# Patient Record
Sex: MALE | Race: WHITE | NOT HISPANIC OR LATINO | Employment: FULL TIME | URBAN - METROPOLITAN AREA
[De-identification: names, ages, dates, MRNs, and addresses within clinical notes are randomized per-mention and may not be internally consistent; named-entity substitution may affect disease eponyms.]

---

## 2023-06-01 LAB
AMPHETAMINE (PRESENCE) IN URINE BY SCREEN METHOD: NORMAL
BARBITURATES PRESENCE IN URINE BY SCREEN METHOD: NORMAL
BENZODIAZEPINE (PRESENCE) IN URINE BY SCREEN METHOD: NORMAL
CANNABINOIDS IN URINE BY SCREEN METHOD: NORMAL
COCAINE (PRESENCE) IN URINE BY SCREEN METHOD: NORMAL
DRUG SCREEN COMMENT URINE: NORMAL
FENTANYL URINE: NORMAL
METHADONE (PRESENCE) IN URINE BY SCREEN METHOD: NORMAL
OPIATES (PRESENCE) IN URINE BY SCREEN METHOD: NORMAL
OXYCODONE (PRESENCE) IN URINE BY SCREEN METHOD: NORMAL
PHENCYCLIDINE (PRESENCE) IN URINE BY SCREEN METHOD: NORMAL

## 2023-07-10 ENCOUNTER — TELEPHONE (OUTPATIENT)
Dept: PRIMARY CARE | Facility: CLINIC | Age: 58
End: 2023-07-10
Payer: COMMERCIAL

## 2023-07-12 LAB
FERRITIN (UG/LL) IN SER/PLAS: 137 UG/L (ref 20–300)
IRON (UG/DL) IN SER/PLAS: 177 UG/DL (ref 35–150)
IRON BINDING CAPACITY (UG/DL) IN SER/PLAS: 439 UG/DL (ref 240–445)
IRON SATURATION (%) IN SER/PLAS: 40 % (ref 25–45)

## 2023-07-18 ENCOUNTER — APPOINTMENT (OUTPATIENT)
Dept: PRIMARY CARE | Facility: CLINIC | Age: 58
End: 2023-07-18
Payer: COMMERCIAL

## 2023-08-22 PROBLEM — G25.9 EXTRAPYRAMIDAL MOVEMENTS PRESENT: Status: ACTIVE | Noted: 2023-08-22

## 2023-08-22 PROBLEM — E83.52 HYPERCALCEMIA: Status: ACTIVE | Noted: 2023-08-22

## 2023-08-22 PROBLEM — K13.70 LESION OF UVULA: Status: ACTIVE | Noted: 2023-08-22

## 2023-08-22 PROBLEM — E78.00 ELEVATED CHOLESTEROL: Status: ACTIVE | Noted: 2023-08-22

## 2023-08-22 PROBLEM — G43.909 MIGRAINE HEADACHE: Status: ACTIVE | Noted: 2023-08-22

## 2023-08-22 PROBLEM — N20.0 CALCULUS OF KIDNEY: Status: ACTIVE | Noted: 2023-08-22

## 2023-08-22 PROBLEM — G25.71 AKATHISIA: Status: ACTIVE | Noted: 2023-08-22

## 2023-08-22 PROBLEM — E66.9 OBESITY (BMI 30-39.9): Status: ACTIVE | Noted: 2023-08-22

## 2023-08-22 PROBLEM — R30.0 DYSURIA: Status: ACTIVE | Noted: 2023-08-22

## 2023-08-22 PROBLEM — E55.9 VITAMIN D DEFICIENCY: Status: ACTIVE | Noted: 2023-08-22

## 2023-08-22 PROBLEM — G47.33 OBSTRUCTIVE SLEEP APNEA SYNDROME: Status: ACTIVE | Noted: 2023-08-22

## 2023-08-22 PROBLEM — F51.3 SOMNAMBULISM: Status: ACTIVE | Noted: 2023-08-22

## 2023-08-22 PROBLEM — D35.1 PARATHYROID ADENOMA: Status: ACTIVE | Noted: 2023-08-22

## 2023-08-22 PROBLEM — E34.9 TESTOSTERONE DEFICIENCY: Status: ACTIVE | Noted: 2023-08-22

## 2023-08-22 PROBLEM — J30.9 ALLERGIC RHINITIS: Status: ACTIVE | Noted: 2023-08-22

## 2023-08-22 RX ORDER — SERTRALINE HYDROCHLORIDE 50 MG/1
1 TABLET, FILM COATED ORAL DAILY
COMMUNITY
End: 2023-09-28 | Stop reason: SDUPTHER

## 2023-08-22 RX ORDER — POTASSIUM CHLORIDE 750 MG/1
1 TABLET, EXTENDED RELEASE ORAL 2 TIMES DAILY
COMMUNITY
End: 2023-10-11 | Stop reason: SDUPTHER

## 2023-08-22 RX ORDER — SUMATRIPTAN 50 MG/1
1 TABLET, FILM COATED ORAL 2 TIMES DAILY PRN
COMMUNITY
Start: 2019-11-01 | End: 2023-09-28 | Stop reason: SDUPTHER

## 2023-08-22 RX ORDER — ALLOPURINOL 100 MG/1
1 TABLET ORAL DAILY
COMMUNITY
End: 2023-12-19 | Stop reason: SDUPTHER

## 2023-08-22 RX ORDER — CHLORTHALIDONE 25 MG/1
1 TABLET ORAL DAILY
COMMUNITY
End: 2023-09-28 | Stop reason: ALTCHOICE

## 2023-08-22 RX ORDER — TEMAZEPAM 15 MG/1
1 CAPSULE ORAL NIGHTLY PRN
COMMUNITY
Start: 2022-04-27 | End: 2023-09-28 | Stop reason: ALTCHOICE

## 2023-08-22 RX ORDER — ATORVASTATIN CALCIUM 40 MG/1
1 TABLET, FILM COATED ORAL DAILY
COMMUNITY
End: 2023-09-28 | Stop reason: SDUPTHER

## 2023-08-22 RX ORDER — BUPROPION HYDROCHLORIDE 150 MG/1
TABLET, EXTENDED RELEASE ORAL
COMMUNITY
End: 2023-09-28 | Stop reason: ALTCHOICE

## 2023-08-22 RX ORDER — ALLOPURINOL 300 MG/1
1 TABLET ORAL DAILY
COMMUNITY
End: 2023-12-19 | Stop reason: SDUPTHER

## 2023-08-22 RX ORDER — TEMAZEPAM 7.5 MG/1
1 CAPSULE ORAL DAILY
COMMUNITY
Start: 2021-12-13 | End: 2023-09-28 | Stop reason: ALTCHOICE

## 2023-08-22 RX ORDER — CHOLECALCIFEROL (VITAMIN D3) 25 MCG
TABLET ORAL
COMMUNITY

## 2023-08-22 RX ORDER — NITROFURANTOIN 25; 75 MG/1; MG/1
1 CAPSULE ORAL 2 TIMES DAILY
COMMUNITY
Start: 2023-03-28 | End: 2023-09-28 | Stop reason: ALTCHOICE

## 2023-08-22 RX ORDER — PRAZOSIN HYDROCHLORIDE 2 MG/1
CAPSULE ORAL
COMMUNITY
End: 2023-09-28 | Stop reason: ALTCHOICE

## 2023-08-22 RX ORDER — MODAFINIL 100 MG/1
1 TABLET ORAL EVERY MORNING
COMMUNITY
Start: 2023-06-07 | End: 2023-10-18 | Stop reason: WASHOUT

## 2023-08-22 RX ORDER — MELATONIN 10 MG
CAPSULE ORAL
COMMUNITY
End: 2023-09-28 | Stop reason: ALTCHOICE

## 2023-09-17 PROBLEM — D31.30 CHOROIDAL NEVUS: Status: ACTIVE | Noted: 2023-09-17

## 2023-09-17 PROBLEM — E83.10 IRON METABOLISM DISORDER: Status: ACTIVE | Noted: 2023-09-17

## 2023-09-17 PROBLEM — G47.10 HYPERSOMNOLENCE: Status: ACTIVE | Noted: 2023-09-17

## 2023-09-17 PROBLEM — G25.81 RESTLESS LEG SYNDROME: Status: ACTIVE | Noted: 2023-09-17

## 2023-09-17 PROBLEM — E21.0 PRIMARY HYPERPARATHYROIDISM (MULTI): Status: ACTIVE | Noted: 2023-09-17

## 2023-09-17 PROBLEM — G47.52 REM SLEEP BEHAVIOR DISORDER: Status: ACTIVE | Noted: 2023-09-17

## 2023-09-17 PROBLEM — Z97.3 WEARS GLASSES: Status: ACTIVE | Noted: 2023-09-17

## 2023-09-17 RX ORDER — GABAPENTIN 300 MG/1
300 CAPSULE ORAL NIGHTLY
COMMUNITY
End: 2023-09-28 | Stop reason: ALTCHOICE

## 2023-09-17 RX ORDER — ZINC GLUCONATE 50 MG
TABLET ORAL DAILY
COMMUNITY

## 2023-09-17 RX ORDER — SILDENAFIL 100 MG/1
100 TABLET, FILM COATED ORAL DAILY PRN
COMMUNITY

## 2023-09-28 ENCOUNTER — OFFICE VISIT (OUTPATIENT)
Dept: PRIMARY CARE | Facility: CLINIC | Age: 58
End: 2023-09-28
Payer: COMMERCIAL

## 2023-09-28 VITALS
DIASTOLIC BLOOD PRESSURE: 79 MMHG | HEIGHT: 74 IN | WEIGHT: 266.6 LBS | TEMPERATURE: 98.8 F | HEART RATE: 103 BPM | BODY MASS INDEX: 34.22 KG/M2 | SYSTOLIC BLOOD PRESSURE: 121 MMHG

## 2023-09-28 DIAGNOSIS — E55.9 VITAMIN D DEFICIENCY: ICD-10-CM

## 2023-09-28 DIAGNOSIS — D35.1 PARATHYROID ADENOMA: ICD-10-CM

## 2023-09-28 DIAGNOSIS — F33.42 RECURRENT MAJOR DEPRESSIVE DISORDER, IN FULL REMISSION (CMS-HCC): ICD-10-CM

## 2023-09-28 DIAGNOSIS — E21.0 PRIMARY HYPERPARATHYROIDISM (MULTI): ICD-10-CM

## 2023-09-28 DIAGNOSIS — E78.2 MIXED HYPERLIPIDEMIA: ICD-10-CM

## 2023-09-28 DIAGNOSIS — E83.10 IRON METABOLISM DISORDER: ICD-10-CM

## 2023-09-28 DIAGNOSIS — E79.0 HYPERURICEMIA: ICD-10-CM

## 2023-09-28 DIAGNOSIS — G43.009 MIGRAINE WITHOUT AURA AND WITHOUT STATUS MIGRAINOSUS, NOT INTRACTABLE: Primary | ICD-10-CM

## 2023-09-28 PROCEDURE — 1036F TOBACCO NON-USER: CPT | Performed by: INTERNAL MEDICINE

## 2023-09-28 PROCEDURE — 99214 OFFICE O/P EST MOD 30 MIN: CPT | Performed by: INTERNAL MEDICINE

## 2023-09-28 RX ORDER — ALLOPURINOL 100 MG/1
100 TABLET ORAL DAILY
Status: CANCELLED | OUTPATIENT
Start: 2023-09-28

## 2023-09-28 RX ORDER — SERTRALINE HYDROCHLORIDE 50 MG/1
50 TABLET, FILM COATED ORAL DAILY
Status: CANCELLED | OUTPATIENT
Start: 2023-09-28

## 2023-09-28 RX ORDER — MAGNESIUM CHLORIDE 64 MG
64 TABLET, DELAYED RELEASE (ENTERIC COATED) ORAL DAILY
Qty: 90 TABLET | Refills: 3 | Status: SHIPPED | OUTPATIENT
Start: 2023-09-28 | End: 2024-01-04 | Stop reason: SDUPTHER

## 2023-09-28 RX ORDER — ZINC GLUCONATE 50 MG
TABLET ORAL DAILY
Status: CANCELLED | OUTPATIENT
Start: 2023-09-28

## 2023-09-28 RX ORDER — VERAPAMIL HYDROCHLORIDE 120 MG/1
120 CAPSULE, EXTENDED RELEASE ORAL NIGHTLY
Qty: 90 CAPSULE | Refills: 0 | Status: SHIPPED | OUTPATIENT
Start: 2023-09-28 | End: 2023-12-19 | Stop reason: SDUPTHER

## 2023-09-28 RX ORDER — POTASSIUM CHLORIDE 750 MG/1
10 TABLET, EXTENDED RELEASE ORAL 2 TIMES DAILY
Status: CANCELLED | OUTPATIENT
Start: 2023-09-28

## 2023-09-28 RX ORDER — ATORVASTATIN CALCIUM 40 MG/1
40 TABLET, FILM COATED ORAL DAILY
Status: CANCELLED | OUTPATIENT
Start: 2023-09-28

## 2023-09-28 RX ORDER — SILDENAFIL 100 MG/1
100 TABLET, FILM COATED ORAL DAILY PRN
Qty: 10 TABLET | Status: CANCELLED | OUTPATIENT
Start: 2023-09-28

## 2023-09-28 RX ORDER — SUMATRIPTAN 50 MG/1
50 TABLET, FILM COATED ORAL 2 TIMES DAILY PRN
Qty: 9 TABLET | Status: CANCELLED | OUTPATIENT
Start: 2023-09-28

## 2023-09-28 RX ORDER — ATORVASTATIN CALCIUM 40 MG/1
40 TABLET, FILM COATED ORAL DAILY
Qty: 90 TABLET | Refills: 3 | Status: SHIPPED | OUTPATIENT
Start: 2023-09-28 | End: 2023-12-15

## 2023-09-28 RX ORDER — SUMATRIPTAN SUCCINATE 100 MG/1
100 TABLET ORAL 2 TIMES DAILY PRN
Qty: 10 TABLET | Refills: 3 | Status: SHIPPED | OUTPATIENT
Start: 2023-09-28 | End: 2023-10-03

## 2023-09-28 RX ORDER — ATORVASTATIN CALCIUM 40 MG/1
40 TABLET, FILM COATED ORAL DAILY
Qty: 90 TABLET | Refills: 3 | Status: SHIPPED | OUTPATIENT
Start: 2023-09-28 | End: 2023-09-28 | Stop reason: SDUPTHER

## 2023-09-28 RX ORDER — MODAFINIL 100 MG/1
100 TABLET ORAL EVERY MORNING
Status: CANCELLED | OUTPATIENT
Start: 2023-09-28

## 2023-09-28 RX ORDER — ALLOPURINOL 300 MG/1
300 TABLET ORAL DAILY
Status: CANCELLED | OUTPATIENT
Start: 2023-09-28

## 2023-09-28 RX ORDER — SERTRALINE HYDROCHLORIDE 50 MG/1
50 TABLET, FILM COATED ORAL DAILY
Qty: 90 TABLET | Refills: 3 | Status: SHIPPED | OUTPATIENT
Start: 2023-09-28 | End: 2023-12-15

## 2023-09-28 ASSESSMENT — ENCOUNTER SYMPTOMS
PALPITATIONS: 0
POLYDIPSIA: 0
CHILLS: 0
FEVER: 0
COUGH: 0
SHORTNESS OF BREATH: 0

## 2023-09-28 ASSESSMENT — PAIN SCALES - GENERAL: PAINLEVEL: 0-NO PAIN

## 2023-09-28 NOTE — PROGRESS NOTES
Subjective   Patient ID: Bakari Delarosa is a 57 y.o. male who presents for Annual Exam.    57-year-old male presents today to establish for care.  He was previously my patient with her last visit being over 3 years ago.  He follows with multiple specialists which are detailed in the records and include sleep specialty and urology.  He also follows with endocrinology with a past history of parathyroid disease and uric acid stones.  At this time he is considering methods for weight loss and significant time was spent in counseling of the patient about diet lifestyle and methods to successfully achieve long-term loss.    He has a history of chronic migraine disorder currently very active with 8 to 10/month.  Uses sumatriptan with only moderate success at relieving migraine symptoms when.  No complications or side effects from using the dose of 50 mg as needed for migraine.  However at this dose his success rates for treatment are incomplete and oftentimes as migraines continue despite using the medication properly.  When he gets a migraine it is a chronic left-sided headache that if the sumatriptan does not relieve the last for the rest of the day.  It is not commonly associated with light sensitivity sound sensitivity nausea or other focal neurologic symptoms.  Historically in terms of his headache frequency was under this current amount commonly only a couple of months sometimes months without migraines.  His symptom of the migraine and pattern of the headache have not changed recently.  As part of today's visit I counseled the patient in detail about migraine treatment including chronic preventative care and a plan was established and initiated with close follow-up advised in 1 to 2 months to reevaluate our treatment plan.    His chronic major depressive disorder is well controlled chronically long-term by low-dose Zoloft and there are no concerns at this time related to it.    On recent blood work performed by an  "outside doctor in July of this year there was a mildly high iron level upon that testing was ordered as part of today's visit.         Review of Systems   Constitutional:  Negative for chills and fever.   Respiratory:  Negative for cough and shortness of breath.    Cardiovascular:  Negative for chest pain and palpitations.   Endocrine: Negative for polydipsia and polyuria.       Objective   /79 (BP Location: Right arm, Patient Position: Sitting, BP Cuff Size: Large adult)   Pulse 103   Temp 37.1 °C (98.8 °F)   Ht 1.88 m (6' 2\")   Wt 121 kg (266 lb 9.6 oz)   BMI 34.23 kg/m²     Physical Exam  Constitutional:       Appearance: Normal appearance.   HENT:      Head: Normocephalic and atraumatic.   Eyes:      Extraocular Movements: Extraocular movements intact.      Pupils: Pupils are equal, round, and reactive to light.   Neck:      Thyroid: No thyroid mass or thyromegaly.      Vascular: No carotid bruit.   Cardiovascular:      Rate and Rhythm: Normal rate and regular rhythm.      Heart sounds: No murmur heard.     No friction rub. No gallop.   Pulmonary:      Effort: No respiratory distress.      Breath sounds: No wheezing, rhonchi or rales.   Musculoskeletal:      Cervical back: Neck supple.      Right lower leg: No edema.      Left lower leg: No edema.   Lymphadenopathy:      Cervical: No cervical adenopathy.   Neurological:      Mental Status: He is alert.         Assessment/Plan          "

## 2023-10-02 DIAGNOSIS — G43.009 MIGRAINE WITHOUT AURA AND WITHOUT STATUS MIGRAINOSUS, NOT INTRACTABLE: ICD-10-CM

## 2023-10-03 ENCOUNTER — LAB (OUTPATIENT)
Dept: LAB | Facility: LAB | Age: 58
End: 2023-10-03
Payer: COMMERCIAL

## 2023-10-03 DIAGNOSIS — G43.009 MIGRAINE WITHOUT AURA AND WITHOUT STATUS MIGRAINOSUS, NOT INTRACTABLE: ICD-10-CM

## 2023-10-03 DIAGNOSIS — E83.10 IRON METABOLISM DISORDER: ICD-10-CM

## 2023-10-03 DIAGNOSIS — E21.0 PRIMARY HYPERPARATHYROIDISM (MULTI): ICD-10-CM

## 2023-10-03 DIAGNOSIS — E55.9 VITAMIN D DEFICIENCY: ICD-10-CM

## 2023-10-03 DIAGNOSIS — D35.1 PARATHYROID ADENOMA: ICD-10-CM

## 2023-10-03 DIAGNOSIS — F33.42 RECURRENT MAJOR DEPRESSIVE DISORDER, IN FULL REMISSION (CMS-HCC): ICD-10-CM

## 2023-10-03 DIAGNOSIS — E78.2 MIXED HYPERLIPIDEMIA: ICD-10-CM

## 2023-10-03 DIAGNOSIS — E79.0 HYPERURICEMIA: ICD-10-CM

## 2023-10-03 LAB
25(OH)D3 SERPL-MCNC: 33 NG/ML (ref 31–100)
ALBUMIN SERPL-MCNC: 4.7 G/DL (ref 3.5–5)
ALP BLD-CCNC: 99 U/L (ref 35–125)
ALT SERPL-CCNC: 31 U/L (ref 5–40)
ANION GAP SERPL CALC-SCNC: 15 MMOL/L
AST SERPL-CCNC: 23 U/L (ref 5–40)
BILIRUB SERPL-MCNC: 0.8 MG/DL (ref 0.1–1.2)
BUN SERPL-MCNC: 13 MG/DL (ref 8–25)
CALCIUM SERPL-MCNC: 9.4 MG/DL (ref 8.5–10.4)
CHLORIDE SERPL-SCNC: 101 MMOL/L (ref 97–107)
CHOLEST SERPL-MCNC: 151 MG/DL (ref 133–200)
CHOLEST/HDLC SERPL: 4.4 {RATIO}
CO2 SERPL-SCNC: 25 MMOL/L (ref 24–31)
CREAT SERPL-MCNC: 0.9 MG/DL (ref 0.4–1.6)
ERYTHROCYTE [DISTWIDTH] IN BLOOD BY AUTOMATED COUNT: 12.6 % (ref 11.5–14.5)
EST. AVERAGE GLUCOSE BLD GHB EST-MCNC: 105 MG/DL
GFR SERPL CREATININE-BSD FRML MDRD: >90 ML/MIN/1.73M*2
GLUCOSE SERPL-MCNC: 90 MG/DL (ref 65–99)
HBA1C MFR BLD: 5.3 %
HCT VFR BLD AUTO: 48.2 % (ref 41–52)
HDLC SERPL-MCNC: 34 MG/DL
HGB BLD-MCNC: 17 G/DL (ref 13.5–17.5)
IRON SATN MFR SERPL: 41 % (ref 12–50)
IRON SERPL-MCNC: 157 UG/DL (ref 45–160)
LDLC SERPL CALC-MCNC: 79 MG/DL (ref 65–130)
MCHC RBC AUTO-ENTMCNC: 35.3 G/DL (ref 32–36)
MCV RBC AUTO: 94 FL (ref 80–100)
PLATELET # BLD AUTO: 243 X10*3/UL (ref 150–450)
POTASSIUM SERPL-SCNC: 3.3 MMOL/L (ref 3.4–5.1)
PROT SERPL-MCNC: 7.2 G/DL (ref 5.9–7.9)
RBC # BLD AUTO: 5.13 X10*6/UL (ref 4.5–5.9)
SODIUM SERPL-SCNC: 141 MMOL/L (ref 133–145)
TIBC SERPL-MCNC: 380 UG/DL (ref 228–428)
TRIGL SERPL-MCNC: 190 MG/DL (ref 40–150)
UIBC SERPL-MCNC: 223 UG/DL (ref 110–370)
URATE SERPL-MCNC: 5.7 MG/DL (ref 3.6–7.7)
WBC # BLD AUTO: 11.5 X10*3/UL (ref 4.4–11.3)

## 2023-10-03 PROCEDURE — 83970 ASSAY OF PARATHORMONE: CPT

## 2023-10-03 PROCEDURE — 36415 COLL VENOUS BLD VENIPUNCTURE: CPT

## 2023-10-03 RX ORDER — SUMATRIPTAN SUCCINATE 100 MG/1
100 TABLET ORAL ONCE AS NEEDED
Qty: 9 TABLET | Refills: 11 | Status: SHIPPED | OUTPATIENT
Start: 2023-10-03

## 2023-10-05 LAB — PTH-INTACT SERPL-MCNC: 36.7 PG/ML (ref 18.5–88)

## 2023-10-05 NOTE — RESULT ENCOUNTER NOTE
Cholesterol is noticeably improved from last visit with triglyceride levels dropping from 2 50-1 90.  White blood cell count continue to be right at the high end of normal or slightly higher at 11.5.  Potassium 3.3, stay on potassium supplement. Otherwise no significant concerns at this time.

## 2023-10-10 NOTE — PROGRESS NOTES
HPI:  56[facility] of [facility] with dyslipidemia, gout, kerri, hx of kidney stones, s/p parathyroid surgery for 1 HPT in his early 50's.    -hx of multiple renal stones in his 30's, hx lithotripsy, hx of retrival  -has had renal stones as recent Dec 2022, follows Dr. Quintana yearly  -stones are calcium oxalate stones  -pt takes chlorthalidone and kcl 10meq daily for elevated urinary calcium and renal stone prevention (this has helped)    Labs: oct 2023-cr0.9, k-3.3, ca-9.4  April 2023 litholink 24hr urine: urinary calcium-262, volume 4.14 Liters, all other factors showed decreased risk of renal stone except for citrate 348 mg/day (mid range value)        Current Outpatient Medications:     allopurinol (Zyloprim) 100 mg tablet, Take 1 tablet (100 mg) by mouth once daily., Disp: , Rfl:     allopurinol (Zyloprim) 300 mg tablet, Take 1 tablet (300 mg) by mouth once daily., Disp: , Rfl:     atorvastatin (Lipitor) 40 mg tablet, Take 1 tablet (40 mg) by mouth once daily., Disp: 90 tablet, Rfl: 3    cholecalciferol (Vitamin D-3) 25 MCG (1000 UT) tablet, Take by mouth., Disp: , Rfl:     magnesium chloride (MagDelay) 64 mg EC tablet, Take 1 tablet (64 mg) by mouth once daily., Disp: 90 tablet, Rfl: 3    potassium chloride CR 10 mEq ER tablet, Take 1 tablet (10 mEq) by mouth 2 times a day., Disp: , Rfl:     sertraline (Zoloft) 50 mg tablet, Take 1 tablet (50 mg) by mouth once daily., Disp: 90 tablet, Rfl: 3    sildenafil (Viagra) 100 mg tablet, Take 1 tablet (100 mg) by mouth once daily as needed for erectile dysfunction., Disp: , Rfl:     SUMAtriptan (Imitrex) 100 mg tablet, Take 1 tablet (100 mg) by mouth 1 time if needed for migraine for up to 108 doses., Disp: 9 tablet, Rfl: 11    verapamil ER (Veralan PM) 120 mg 24 hr capsule, Take 1 capsule (120 mg) by mouth once daily at bedtime. Do not crush or chew., Disp: 90 capsule, Rfl: 0    zinc gluconate 50 mg tablet, Take by mouth once daily., Disp: , Rfl:     modafinil  (Provigil) 100 mg tablet, Take 1 tablet (100 mg) by mouth once daily in the morning., Disp: , Rfl:     Review of Systems:  Cardiology: Lightheadedness-denies.  Chest pain-denies.  Leg edema-denies.  Palpitations-denies.  Respiratory: Cough-denies. Shortness of breath-denies.  Wheezing-denies.  Gastroenterology: Constipation-denies, aches that are diet related.  Diarrhea-denies.  Heartburn-denies.  Endocrinology: Cold intolerance-denies.  Heat intolerance-denies.  Sweats-denies.  Neurology: Headache-denies.  Tremor-denies.  Neuropathy in extremities-denies.  Psychology: Low energy-denies.  Irritability-denies.  Sleep disturbances-positive.        Labs/tests:  Status Exam Begun Exam Ended   Final 3/14/2019 15:45      Study Result    Narrative & Impression   PROCEDURE:         DEXA BONE DENSITY STUDY WITH VFA - WXR  0268  REASON FOR EXAM: testicular dysfunction     RESULT: DEXA BONE DENSITY STUDY WITH VFA: 3/14/2019 3:45 PM     CLINICAL HISTORY:  The patient is a 53 years  old Male for a     Bone  Densitometry (DEXA). Testicular dysfunction and hormonal abnormality.     COMPARISON:    .     TECHNIQUE: Bone Densitometry (DEXA) of the lumbar spine, distal forearm and  hip  performed. Lateral and AP  images of the thoracic and lumbar were  obtained for vertebral fracture assessment.     FINDINGS:     Lumbar T-Score: -0.9    Z- Score: -0.5  Femoral neck T-Score: -1.8  Z- Score: -0.9  1/3 forearm T score: -0.8 Z score: -0.2  Total hip T score: -0.8 Z score: -0.4     Please see attached data sheet which will be faxed with the final report.     There is no compression fracture noted.     10-year fracture risk: Major osteoporotic fracture (FRAX): 5.0%%                                     Hip fracture: 0.6%%  (FRAX Version 3.05. Fracture probability calculated for an untreated  patient.  Fracture probability may be lower if the patient has received  treatment)     IMPRESSION:  Osteopenia.     /83   Wt 121 kg (266 lb)    BMI 34.15 kg/m²     Physical Exam:  General Appearance: pleasant, cooperative, no acute distress  HEENT: no chemosis, no proptosis, no lid lag, no lid retraction  Neck: no lymphadenopathy, no thyromegaly, no dominant thyroid nodules  Heart: no murmurs, regular rate and rhythm, S1 and S2  Lungs: no wheezes, no rhonci, no rales  Extremities: no lower extremity swelling    Assessment and Plan:  1. Calculus of kidney  -continue aggressive fluid intake as well as chlorthalidone  -increase kcl from 10 to 20 eq given recent labs         Follow Up:  -yearly      -labs/tests/notes reviewed  -reviewed and counseled patient on medication monitoring and side effects

## 2023-10-11 ENCOUNTER — OFFICE VISIT (OUTPATIENT)
Dept: ENDOCRINOLOGY | Facility: CLINIC | Age: 58
End: 2023-10-11
Payer: COMMERCIAL

## 2023-10-11 VITALS — SYSTOLIC BLOOD PRESSURE: 128 MMHG | WEIGHT: 266 LBS | DIASTOLIC BLOOD PRESSURE: 83 MMHG | BODY MASS INDEX: 34.15 KG/M2

## 2023-10-11 DIAGNOSIS — N20.0 CALCULUS OF KIDNEY: Primary | ICD-10-CM

## 2023-10-11 PROCEDURE — 99213 OFFICE O/P EST LOW 20 MIN: CPT | Performed by: INTERNAL MEDICINE

## 2023-10-11 PROCEDURE — 1036F TOBACCO NON-USER: CPT | Performed by: INTERNAL MEDICINE

## 2023-10-11 RX ORDER — CHLORTHALIDONE 25 MG/1
25 TABLET ORAL DAILY
Qty: 90 TABLET | Refills: 3 | Status: SHIPPED | OUTPATIENT
Start: 2023-10-11 | End: 2023-12-19 | Stop reason: SDUPTHER

## 2023-10-11 RX ORDER — POTASSIUM CHLORIDE 20 MEQ/1
20 TABLET, EXTENDED RELEASE ORAL DAILY
Qty: 90 TABLET | Refills: 3 | Status: SHIPPED | OUTPATIENT
Start: 2023-10-11 | End: 2023-12-19 | Stop reason: SDUPTHER

## 2023-10-11 ASSESSMENT — PAIN SCALES - GENERAL: PAINLEVEL: 0-NO PAIN

## 2023-10-11 ASSESSMENT — ENCOUNTER SYMPTOMS
DEPRESSION: 0
LOSS OF SENSATION IN FEET: 0
OCCASIONAL FEELINGS OF UNSTEADINESS: 0

## 2023-10-11 ASSESSMENT — PATIENT HEALTH QUESTIONNAIRE - PHQ9
SUM OF ALL RESPONSES TO PHQ9 QUESTIONS 1 & 2: 0
1. LITTLE INTEREST OR PLEASURE IN DOING THINGS: NOT AT ALL
2. FEELING DOWN, DEPRESSED OR HOPELESS: NOT AT ALL

## 2023-10-18 ENCOUNTER — OFFICE VISIT (OUTPATIENT)
Dept: SLEEP MEDICINE | Facility: CLINIC | Age: 58
End: 2023-10-18
Payer: COMMERCIAL

## 2023-10-18 VITALS
SYSTOLIC BLOOD PRESSURE: 122 MMHG | DIASTOLIC BLOOD PRESSURE: 78 MMHG | BODY MASS INDEX: 35.12 KG/M2 | HEIGHT: 73 IN | HEART RATE: 107 BPM | WEIGHT: 265 LBS

## 2023-10-18 DIAGNOSIS — G47.33 OBSTRUCTIVE SLEEP APNEA SYNDROME: Primary | ICD-10-CM

## 2023-10-18 DIAGNOSIS — G47.10 HYPERSOMNOLENCE: ICD-10-CM

## 2023-10-18 DIAGNOSIS — G47.52 REM SLEEP BEHAVIOR DISORDER: ICD-10-CM

## 2023-10-18 DIAGNOSIS — F51.12 BEHAVIORALLY INDUCED INSUFFICIENT SLEEP SYNDROME: ICD-10-CM

## 2023-10-18 PROCEDURE — 1036F TOBACCO NON-USER: CPT | Performed by: INTERNAL MEDICINE

## 2023-10-18 PROCEDURE — 99214 OFFICE O/P EST MOD 30 MIN: CPT | Performed by: INTERNAL MEDICINE

## 2023-10-18 ASSESSMENT — SLEEP AND FATIGUE QUESTIONNAIRES
HOW LIKELY ARE YOU TO NOD OFF OR FALL ASLEEP WHEN YOU ARE A PASSENGER IN A CAR FOR AN HOUR WITHOUT A BREAK: WOULD NEVER DOZE
SITING INACTIVE IN A PUBLIC PLACE LIKE A CLASS ROOM OR A MOVIE THEATER: WOULD NEVER DOZE
ESS-CHAD TOTAL SCORE: 11
HOW LIKELY ARE YOU TO NOD OFF OR FALL ASLEEP WHILE LYING DOWN TO REST IN THE AFTERNOON WHEN CIRCUMSTANCES PERMIT: HIGH CHANCE OF DOZING
HOW LIKELY ARE YOU TO NOD OFF OR FALL ASLEEP WHILE SITTING AND TALKING TO SOMEONE: WOULD NEVER DOZE
HOW LIKELY ARE YOU TO NOD OFF OR FALL ASLEEP WHILE SITTING AND READING: HIGH CHANCE OF DOZING
HOW LIKELY ARE YOU TO NOD OFF OR FALL ASLEEP IN A CAR, WHILE STOPPED FOR A FEW MINUTES IN TRAFFIC: WOULD NEVER DOZE
HOW LIKELY ARE YOU TO NOD OFF OR FALL ASLEEP WHILE SITTING QUIETLY AFTER LUNCH WITHOUT ALCOHOL: HIGH CHANCE OF DOZING
HOW LIKELY ARE YOU TO NOD OFF OR FALL ASLEEP WHILE WATCHING TV: MODERATE CHANCE OF DOZING

## 2023-10-18 ASSESSMENT — PAIN SCALES - GENERAL: PAINLEVEL: 0-NO PAIN

## 2023-10-18 NOTE — ASSESSMENT & PLAN NOTE
Ferritin 137 (7/2023), iron panel normal  - continuing gabapentin, wife reports movements in bed   coagulation(Bleeding disorder R/T clinical cond/anti-coags)

## 2023-10-18 NOTE — PROGRESS NOTES
"    Subjective   Patient ID: Bakari Delarosa is a 57 y.o. male who presents for Sleep Apnea and Pap Adherence Followup.  HPI    Here to monitor response to modafinil. Does not notice much improvement.    A downloaded compliance report was reviewed and was interpreted by myself as follows:  > 4 hour compliance was 80 %, with an average use of 5 hours and 55 minutes, with a residual AHI 2.1 .     Bakari Delarosa reports good benefit from his device, but still having daytime sleepiness    Working days sleep schedule:  He reports bedtime 10 PM  Falls asleep time: 10:15 PM  Wake time 4 AM  Then he will go to work, despite not needing to go to work until the time he wants to    Non-Working days sleep schedule:  He reports bedtime 10 PM  Falls asleep time: 10:15 PM  Wake time 6 AM    Naps during the day for 2-3 hours on weekends      Review of Systems  Review of systems negative except as per HPI  Objective   /78   Pulse 107   Ht 1.854 m (6' 1\")   Wt 120 kg (265 lb)   BMI 34.96 kg/m²    Physical Exam  Constitutional: drowsy, in no acute distress.   Head: normocephalic, atraumatic.   Eyes: pink palpebral conjunctivae, anicteric sclerae.   Nose: nasal mucosa, septum, and turbinates: normal.   Craniofacial Features: no abnormal craniofacial features.   Oropharyngeal: no oropharyngeal lesions.   Uvula: midline.   Leach Tongue Position is: III.   Mallampati Airway Class: 3.   Neck: trachea midline.   Extremities: no cyanosis, no clubbing.   Psychiatric: mood and affect: normal.      Assessment/Plan   Problem List Items Addressed This Visit             ICD-10-CM    Obstructive sleep apnea syndrome - Primary G47.33     Great compliance. Indicates insufficient sleep. So RIMA, less likely cause of sleepiness         Hypersomnolence G47.10     Discontinuing modafinil, he reports he is not responding to medication. I explained I am concerned about his insufficient sleep causing symptoms. We discussed concerns over " psychosis and stimulants in sleep deprivation. 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 Short naps if needed.  If he is getting sufficient sleep 7-8 hours consistently over the next 3 months and still sleepy, then we discussed a need to get a PSG/MSLT

## 2023-10-18 NOTE — ASSESSMENT & PLAN NOTE
Discontinuing modafinil, he reports he is not responding to medication. I explained I am concerned about his insufficient sleep causing symptoms. We discussed concerns over psychosis and stimulants in sleep deprivation. Stopping stimulants at this time

## 2023-10-18 NOTE — ASSESSMENT & PLAN NOTE
Recommended he sleep in until 6 AM for work, stop waking up at 4 AM, avoid clock watching, set an alarm, stimulus control. Short naps if needed.  If he is getting sufficient sleep 7-8 hours consistently over the next 3 months and still sleepy, then we discussed a need to get a PSG/MSLT

## 2023-10-18 NOTE — ASSESSMENT & PLAN NOTE
Sertraline may be contributing to this, however, he has not done well when switching off it  - Resume melatonin 5 mg nightly, which he has not started at this visit  - Spouse reports that he  has had a few episodes , but mild and not injurious  - Did not tolerate clonzapam, or temazepam, may consider alprazolam in the future

## 2023-11-14 DIAGNOSIS — E78.2 MIXED HYPERLIPIDEMIA: ICD-10-CM

## 2023-11-14 DIAGNOSIS — F33.42 RECURRENT MAJOR DEPRESSIVE DISORDER, IN FULL REMISSION (CMS-HCC): ICD-10-CM

## 2023-12-05 ENCOUNTER — OFFICE VISIT (OUTPATIENT)
Dept: OPHTHALMOLOGY | Facility: CLINIC | Age: 58
End: 2023-12-05
Payer: COMMERCIAL

## 2023-12-05 ENCOUNTER — CLINICAL SUPPORT (OUTPATIENT)
Dept: OPHTHALMOLOGY | Facility: CLINIC | Age: 58
End: 2023-12-05
Payer: COMMERCIAL

## 2023-12-05 DIAGNOSIS — H25.13 AGE-RELATED NUCLEAR CATARACT OF BOTH EYES: Primary | ICD-10-CM

## 2023-12-05 DIAGNOSIS — D31.31 NEVUS OF CHOROID OF RIGHT EYE: ICD-10-CM

## 2023-12-05 DIAGNOSIS — H52.7 DISORDER OF REFRACTION: ICD-10-CM

## 2023-12-05 PROCEDURE — 92015 DETERMINE REFRACTIVE STATE: CPT | Performed by: OPHTHALMOLOGY

## 2023-12-05 PROCEDURE — 99213 OFFICE O/P EST LOW 20 MIN: CPT | Performed by: OPHTHALMOLOGY

## 2023-12-05 PROCEDURE — 99203 OFFICE O/P NEW LOW 30 MIN: CPT | Performed by: OPHTHALMOLOGY

## 2023-12-05 RX ORDER — MODAFINIL 200 MG/1
200 TABLET ORAL DAILY
COMMUNITY
End: 2023-12-19 | Stop reason: WASHOUT

## 2023-12-05 RX ORDER — HYDROCHLOROTHIAZIDE 12.5 MG/1
12.5 CAPSULE ORAL EVERY 24 HOURS
COMMUNITY
End: 2023-12-05 | Stop reason: WASHOUT

## 2023-12-05 ASSESSMENT — ENCOUNTER SYMPTOMS
DEPRESSION: 0
EYES NEGATIVE: 0
PSYCHIATRIC NEGATIVE: 0
HEMATOLOGIC/LYMPHATIC NEGATIVE: 0
RESPIRATORY NEGATIVE: 0
LOSS OF SENSATION IN FEET: 0
CONSTITUTIONAL NEGATIVE: 0
MUSCULOSKELETAL NEGATIVE: 0
OCCASIONAL FEELINGS OF UNSTEADINESS: 0
ALLERGIC/IMMUNOLOGIC NEGATIVE: 0
GASTROINTESTINAL NEGATIVE: 0
NEUROLOGICAL NEGATIVE: 0
ENDOCRINE NEGATIVE: 0
CARDIOVASCULAR NEGATIVE: 0

## 2023-12-05 ASSESSMENT — REFRACTION_WEARINGRX
OS_AXIS: 075
OD_SPHERE: +3.50
OS_CYLINDER: -0.25
OD_CYLINDER: -3.50
OD_AXIS: 052
OS_ADD: +2.50
OD_ADD: +2.50
OS_SPHERE: +0.50

## 2023-12-05 ASSESSMENT — VISUAL ACUITY
OD_CC+: +1
OD_CC: 20/25
METHOD: SNELLEN - SINGLE
CORRECTION_TYPE: GLASSES
OS_CC: 20/20

## 2023-12-05 ASSESSMENT — TONOMETRY
OS_IOP_MMHG: 17
OD_IOP_MMHG: 17
IOP_METHOD: GOLDMANN APPLANATION

## 2023-12-05 ASSESSMENT — PATIENT HEALTH QUESTIONNAIRE - PHQ9
1. LITTLE INTEREST OR PLEASURE IN DOING THINGS: NOT AT ALL
2. FEELING DOWN, DEPRESSED OR HOPELESS: NOT AT ALL
SUM OF ALL RESPONSES TO PHQ9 QUESTIONS 1 AND 2: 0

## 2023-12-05 ASSESSMENT — KERATOMETRY
OD_K1POWER_DIOPTERS: 41.00
METHOD_AUTO_MANUAL: AUTOMATED
OD_AXISANGLE_DEGREES: 130
OS_AXISANGLE2_DEGREES: 165
OS_K1POWER_DIOPTERS: 42.50
OS_K2POWER_DIOPTERS: 43.00
OD_K2POWER_DIOPTERS: 44.00
OD_AXISANGLE2_DEGREES: 40
OS_AXISANGLE_DEGREES: 75

## 2023-12-05 ASSESSMENT — REFRACTION_MANIFEST
OS_AXIS: 170
OD_CYLINDER: -4.00
OD_SPHERE: +3.25
OS_SPHERE: +0.25
OS_CYLINDER: -0.25
OD_AXIS: 045

## 2023-12-05 ASSESSMENT — CUP TO DISC RATIO
OS_RATIO: 0.35
OD_RATIO: 0.25

## 2023-12-05 ASSESSMENT — EXTERNAL EXAM - RIGHT EYE: OD_EXAM: NORMAL

## 2023-12-05 ASSESSMENT — PAIN SCALES - GENERAL: PAINLEVEL: 0-NO PAIN

## 2023-12-05 ASSESSMENT — SLIT LAMP EXAM - LIDS
COMMENTS: NORMAL
COMMENTS: NORMAL

## 2023-12-05 ASSESSMENT — EXTERNAL EXAM - LEFT EYE: OS_EXAM: NORMAL

## 2023-12-05 NOTE — PATIENT INSTRUCTIONS
Thank you so much for choosing me to provide your care today!    If you were dilated your vision may remain blurry   or light sensitive for several hours.    The nature of eye and vision problems can require frequent follow up, please make every effort to adhere to any future appointments.    If you have any issues, questions, or concerns,   please do not hesitate to reach out.    If you receive a survey in regards to your care today, please mention any exceptional care my office staff and/or technicians provided.    You can reach our office at this number:  [phone]   
Statement Selected

## 2023-12-05 NOTE — ASSESSMENT & PLAN NOTE
Small inferior along nerve right eye (OD), no concerning features, will monitor with serial exam.

## 2023-12-05 NOTE — PROGRESS NOTES
Assessment/Plan   Problem List Items Addressed This Visit          Eye/Vision problems    Disorder of refraction     Discussed glasses prescription from refraction. Will provide if patient interested in keeping for records or to fill as a new set of glasses.            Choroidal nevus     Small inferior along nerve right eye (OD), no concerning features, will monitor with serial exam.          Age-related nuclear cataract of both eyes - Primary     Non significant cataract noted on exam. Will plan to continue to monitor with serial exam.               Provided reassurance regarding above diagnoses and care received in the office visit today. Discussed outcomes and options along with the importance of treatment compliance. Understands the importance of any follow up visits. Patient instructed to call/communicate with our office if any new issues, questions, or concerns.     Will plan to see back in 1 year for full or sooner PRN

## 2023-12-15 RX ORDER — ATORVASTATIN CALCIUM 40 MG/1
40 TABLET, FILM COATED ORAL DAILY
Qty: 90 TABLET | Refills: 3 | Status: SHIPPED | OUTPATIENT
Start: 2023-12-15 | End: 2023-12-19 | Stop reason: SDUPTHER

## 2023-12-15 RX ORDER — SERTRALINE HYDROCHLORIDE 50 MG/1
50 TABLET, FILM COATED ORAL DAILY
Qty: 90 TABLET | Refills: 3 | Status: SHIPPED | OUTPATIENT
Start: 2023-12-15 | End: 2023-12-19 | Stop reason: SDUPTHER

## 2023-12-19 ENCOUNTER — OFFICE VISIT (OUTPATIENT)
Dept: PRIMARY CARE | Facility: CLINIC | Age: 58
End: 2023-12-19
Payer: COMMERCIAL

## 2023-12-19 VITALS
TEMPERATURE: 97.6 F | BODY MASS INDEX: 35.38 KG/M2 | SYSTOLIC BLOOD PRESSURE: 112 MMHG | DIASTOLIC BLOOD PRESSURE: 76 MMHG | HEART RATE: 83 BPM | WEIGHT: 268.2 LBS

## 2023-12-19 DIAGNOSIS — R61 CHRONIC NIGHT SWEATS: ICD-10-CM

## 2023-12-19 DIAGNOSIS — N20.0 CALCULUS OF KIDNEY: ICD-10-CM

## 2023-12-19 DIAGNOSIS — R68.82 DECREASED LIBIDO: ICD-10-CM

## 2023-12-19 DIAGNOSIS — F33.42 RECURRENT MAJOR DEPRESSIVE DISORDER, IN FULL REMISSION (CMS-HCC): ICD-10-CM

## 2023-12-19 DIAGNOSIS — E79.0 HYPERURICEMIA: Primary | ICD-10-CM

## 2023-12-19 DIAGNOSIS — E78.2 MIXED HYPERLIPIDEMIA: ICD-10-CM

## 2023-12-19 DIAGNOSIS — G43.009 MIGRAINE WITHOUT AURA AND WITHOUT STATUS MIGRAINOSUS, NOT INTRACTABLE: ICD-10-CM

## 2023-12-19 DIAGNOSIS — D72.829 LEUKOCYTOSIS, UNSPECIFIED TYPE: ICD-10-CM

## 2023-12-19 PROCEDURE — 99214 OFFICE O/P EST MOD 30 MIN: CPT | Performed by: INTERNAL MEDICINE

## 2023-12-19 PROCEDURE — 1036F TOBACCO NON-USER: CPT | Performed by: INTERNAL MEDICINE

## 2023-12-19 RX ORDER — ALLOPURINOL 300 MG/1
300 TABLET ORAL DAILY
Qty: 90 TABLET | Refills: 3 | Status: SHIPPED | OUTPATIENT
Start: 2023-12-19 | End: 2024-12-18

## 2023-12-19 RX ORDER — VERAPAMIL HYDROCHLORIDE 180 MG/1
180 CAPSULE, EXTENDED RELEASE ORAL NIGHTLY
Qty: 90 CAPSULE | Refills: 0 | Status: SHIPPED | OUTPATIENT
Start: 2023-12-19 | End: 2024-01-23 | Stop reason: SDUPTHER

## 2023-12-19 RX ORDER — POTASSIUM CHLORIDE 20 MEQ/1
20 TABLET, EXTENDED RELEASE ORAL DAILY
Qty: 90 TABLET | Refills: 3 | Status: SHIPPED | OUTPATIENT
Start: 2023-12-19 | End: 2024-12-18

## 2023-12-19 RX ORDER — SERTRALINE HYDROCHLORIDE 50 MG/1
50 TABLET, FILM COATED ORAL DAILY
Qty: 90 TABLET | Refills: 3 | Status: SHIPPED | OUTPATIENT
Start: 2023-12-19 | End: 2024-01-23 | Stop reason: SDUPTHER

## 2023-12-19 RX ORDER — VERAPAMIL HYDROCHLORIDE 120 MG/1
120 CAPSULE, EXTENDED RELEASE ORAL NIGHTLY
Qty: 90 CAPSULE | Refills: 0 | Status: SHIPPED | OUTPATIENT
Start: 2023-12-19 | End: 2023-12-19 | Stop reason: SDUPTHER

## 2023-12-19 RX ORDER — ALLOPURINOL 100 MG/1
100 TABLET ORAL DAILY
Qty: 90 TABLET | Refills: 3 | Status: SHIPPED | OUTPATIENT
Start: 2023-12-19 | End: 2024-12-18

## 2023-12-19 RX ORDER — ATORVASTATIN CALCIUM 40 MG/1
40 TABLET, FILM COATED ORAL DAILY
Qty: 90 TABLET | Refills: 3 | Status: SHIPPED | OUTPATIENT
Start: 2023-12-19 | End: 2024-12-18

## 2023-12-19 RX ORDER — CHLORTHALIDONE 25 MG/1
25 TABLET ORAL DAILY
Qty: 90 TABLET | Refills: 3 | Status: SHIPPED | OUTPATIENT
Start: 2023-12-19 | End: 2024-12-18

## 2023-12-19 ASSESSMENT — ENCOUNTER SYMPTOMS
SHORTNESS OF BREATH: 0
COUGH: 0
POLYDIPSIA: 0
CHILLS: 0
PALPITATIONS: 0
FEVER: 0

## 2023-12-19 NOTE — PROGRESS NOTES
Subjective   Patient ID: Bakari Delarosa is a 57 y.o. male who presents for Follow-up.    57-year-old male presents for follow-up on chronic migraines.  Migraine frequency is currently reduced to approximately 7/month tending to cluster with migraines repetitively over 2 to 3 days and then a break for a week or 2.  Sumatriptan dosing does seem to help but there is a delay in its efficacy.  Ubrelvy sample he was provided helped immediately and then prevented any further follow-up migraines for a few days.  He does not specifically subscribe to any complications or side effects from sumatriptan at his current dose aside from the delayed benefits.    We discussed the modification to his treatment plan including increasing the dose of verapamil, and continuing the magnesium, starting a prescription for Ubrelvy and he was provided instructions on how to obtain a drug assistance card for that medication.    In addition to the above we discussed the issue with worsening the chronic night sweats.  He has a history of stable asymptomatic leukocytosis with previously negative workup.  Is been no enlarged lymph nodes.  No inappropriate weight loss.  No new other physical symptoms just night sweats.    Chronic fatigue, following with sleep medicine regarding his hypersomnia.  But also has decreased libido I want to check a testosterone level         Review of Systems   Constitutional:  Negative for chills and fever.   Respiratory:  Negative for cough and shortness of breath.    Cardiovascular:  Negative for chest pain and palpitations.   Endocrine: Negative for polydipsia and polyuria.       Objective   /76 (BP Location: Left arm, Patient Position: Sitting, BP Cuff Size: Large adult)   Pulse 83   Temp 36.4 °C (97.6 °F) (Temporal)   Wt 122 kg (268 lb 3.2 oz)   BMI 35.38 kg/m²     Physical Exam  Constitutional:       Appearance: Normal appearance.   HENT:      Head: Normocephalic and atraumatic.   Eyes:      Extraocular  Movements: Extraocular movements intact.      Pupils: Pupils are equal, round, and reactive to light.   Neck:      Thyroid: No thyroid mass or thyromegaly.      Vascular: No carotid bruit.   Cardiovascular:      Rate and Rhythm: Normal rate and regular rhythm.      Heart sounds: No murmur heard.     No friction rub. No gallop.   Pulmonary:      Effort: No respiratory distress.      Breath sounds: No wheezing, rhonchi or rales.   Musculoskeletal:      Cervical back: Neck supple.      Right lower leg: No edema.      Left lower leg: No edema.   Lymphadenopathy:      Cervical: No cervical adenopathy.   Neurological:      Mental Status: He is alert.         Assessment/Plan   Problem List Items Addressed This Visit             ICD-10-CM    Calculus of kidney N20.0    Relevant Medications    potassium chloride CR (Klor-Con M20) 20 mEq ER tablet    chlorthalidone (Hygroton) 25 mg tablet    Migraine headache G43.909    Relevant Medications    ubrogepant (Ubrelvy) 100 mg tablet tablet    verapamil ER (Veralan PM) 180 mg 24 hr capsule    Hyperuricemia - Primary E79.0    Relevant Medications    allopurinol (Zyloprim) 100 mg tablet    allopurinol (Zyloprim) 300 mg tablet     Other Visit Diagnoses         Codes    Mixed hyperlipidemia     E78.2    Relevant Medications    atorvastatin (Lipitor) 40 mg tablet    Recurrent major depressive disorder, in full remission (CMS/HCC)     F33.42    Relevant Medications    sertraline (Zoloft) 50 mg tablet    Leukocytosis, unspecified type     D72.829    Relevant Orders    CBC and Auto Differential    TSH with reflex to Free T4 if abnormal    Flow, Path Review & Reflex Genetics, Blood    Chronic night sweats     R61    Relevant Orders    CBC and Auto Differential    TSH with reflex to Free T4 if abnormal    Flow, Path Review & Reflex Genetics, Blood    Decreased libido     R68.82    Relevant Orders    Testosterone

## 2023-12-19 NOTE — PATIENT INSTRUCTIONS
I have printed a prescription for Ubrelvy to use as needed for migraine.  You can use this with or in replacement of the sumatriptan.  Ubrelvy has a drug assistance card you can go to their website fill out a very simple couple questions and print off that card for the lowest possible co-pay including $0.  You would then take that card and the paper prescription I am printing today to the local pharmacy for your pharmacist to run it properly to guarantee you can get it.

## 2023-12-28 ENCOUNTER — LAB (OUTPATIENT)
Dept: LAB | Facility: LAB | Age: 58
End: 2023-12-28
Payer: COMMERCIAL

## 2023-12-28 DIAGNOSIS — R61 CHRONIC NIGHT SWEATS: ICD-10-CM

## 2023-12-28 DIAGNOSIS — D72.829 LEUKOCYTOSIS, UNSPECIFIED TYPE: ICD-10-CM

## 2023-12-28 DIAGNOSIS — R68.82 DECREASED LIBIDO: ICD-10-CM

## 2023-12-28 LAB — TSH SERPL DL<=0.05 MIU/L-ACNC: 1.86 MIU/L (ref 0.27–4.2)

## 2023-12-28 PROCEDURE — 85025 COMPLETE CBC W/AUTO DIFF WBC: CPT

## 2023-12-28 PROCEDURE — 84443 ASSAY THYROID STIM HORMONE: CPT

## 2023-12-28 PROCEDURE — 36415 COLL VENOUS BLD VENIPUNCTURE: CPT

## 2023-12-28 PROCEDURE — 85060 BLOOD SMEAR INTERPRETATION: CPT | Performed by: INTERNAL MEDICINE

## 2023-12-28 PROCEDURE — 84403 ASSAY OF TOTAL TESTOSTERONE: CPT

## 2023-12-29 LAB
BASOPHILS # BLD AUTO: 0.05 X10*3/UL (ref 0–0.1)
BASOPHILS NFR BLD AUTO: 0.6 %
EOSINOPHIL # BLD AUTO: 0.07 X10*3/UL (ref 0–0.7)
EOSINOPHIL NFR BLD AUTO: 0.8 %
ERYTHROCYTE [DISTWIDTH] IN BLOOD BY AUTOMATED COUNT: 12.5 % (ref 11.5–14.5)
HCT VFR BLD AUTO: 47.2 % (ref 41–52)
HGB BLD-MCNC: 16.1 G/DL (ref 13.5–17.5)
IMM GRANULOCYTES # BLD AUTO: 0.09 X10*3/UL (ref 0–0.7)
IMM GRANULOCYTES NFR BLD AUTO: 1 % (ref 0–0.9)
LYMPHOCYTES # BLD AUTO: 2.03 X10*3/UL (ref 1.2–4.8)
LYMPHOCYTES NFR BLD AUTO: 22.6 %
MCH RBC QN AUTO: 32.5 PG (ref 26–34)
MCHC RBC AUTO-ENTMCNC: 34.1 G/DL (ref 32–36)
MCV RBC AUTO: 95 FL (ref 80–100)
MONOCYTES # BLD AUTO: 1.1 X10*3/UL (ref 0.1–1)
MONOCYTES NFR BLD AUTO: 12.2 %
NEUTROPHILS # BLD AUTO: 5.66 X10*3/UL (ref 1.2–7.7)
NEUTROPHILS NFR BLD AUTO: 62.8 %
NRBC BLD-RTO: 0 /100 WBCS (ref 0–0)
PATH REVIEW-CBC DIFFERENTIAL: NORMAL
PLATELET # BLD AUTO: 230 X10*3/UL (ref 150–450)
RBC # BLD AUTO: 4.96 X10*6/UL (ref 4.5–5.9)
TESTOST SERPL-MCNC: 241 NG/DL (ref 240–1000)
WBC # BLD AUTO: 9 X10*3/UL (ref 4.4–11.3)

## 2024-01-04 DIAGNOSIS — G43.009 MIGRAINE WITHOUT AURA AND WITHOUT STATUS MIGRAINOSUS, NOT INTRACTABLE: ICD-10-CM

## 2024-01-04 RX ORDER — MAGNESIUM CHLORIDE 64 MG
64 TABLET, DELAYED RELEASE (ENTERIC COATED) ORAL DAILY
Qty: 90 TABLET | Refills: 3 | Status: SHIPPED | OUTPATIENT
Start: 2024-01-04 | End: 2025-01-03

## 2024-01-17 ENCOUNTER — OFFICE VISIT (OUTPATIENT)
Dept: SLEEP MEDICINE | Facility: CLINIC | Age: 59
End: 2024-01-17
Payer: COMMERCIAL

## 2024-01-17 VITALS
BODY MASS INDEX: 34.46 KG/M2 | SYSTOLIC BLOOD PRESSURE: 130 MMHG | HEIGHT: 73 IN | HEART RATE: 82 BPM | OXYGEN SATURATION: 98 % | WEIGHT: 260 LBS | DIASTOLIC BLOOD PRESSURE: 74 MMHG

## 2024-01-17 DIAGNOSIS — G47.10 HYPERSOMNOLENCE: Primary | ICD-10-CM

## 2024-01-17 DIAGNOSIS — G47.52 REM SLEEP BEHAVIOR DISORDER: ICD-10-CM

## 2024-01-17 DIAGNOSIS — G47.33 OBSTRUCTIVE SLEEP APNEA SYNDROME: ICD-10-CM

## 2024-01-17 PROCEDURE — 99214 OFFICE O/P EST MOD 30 MIN: CPT | Performed by: INTERNAL MEDICINE

## 2024-01-17 PROCEDURE — 1036F TOBACCO NON-USER: CPT | Performed by: INTERNAL MEDICINE

## 2024-01-17 RX ORDER — ACETAMINOPHEN, DIPHENHYDRAMINE HCL, PHENYLEPHRINE HCL 325; 25; 5 MG/1; MG/1; MG/1
10 TABLET ORAL NIGHTLY
Qty: 30 TABLET | Refills: 0 | Status: SHIPPED | OUTPATIENT
Start: 2024-01-17 | End: 2024-02-16

## 2024-01-17 ASSESSMENT — SLEEP AND FATIGUE QUESTIONNAIRES
HOW LIKELY ARE YOU TO NOD OFF OR FALL ASLEEP WHEN YOU ARE A PASSENGER IN A CAR FOR AN HOUR WITHOUT A BREAK: WOULD NEVER DOZE
ESS-CHAD TOTAL SCORE: 8
SITING INACTIVE IN A PUBLIC PLACE LIKE A CLASS ROOM OR A MOVIE THEATER: WOULD NEVER DOZE
HOW LIKELY ARE YOU TO NOD OFF OR FALL ASLEEP WHILE SITTING QUIETLY AFTER LUNCH WITHOUT ALCOHOL: WOULD NEVER DOZE
HOW LIKELY ARE YOU TO NOD OFF OR FALL ASLEEP WHILE SITTING AND READING: HIGH CHANCE OF DOZING
HOW LIKELY ARE YOU TO NOD OFF OR FALL ASLEEP WHILE LYING DOWN TO REST IN THE AFTERNOON WHEN CIRCUMSTANCES PERMIT: HIGH CHANCE OF DOZING
HOW LIKELY ARE YOU TO NOD OFF OR FALL ASLEEP WHILE WATCHING TV: MODERATE CHANCE OF DOZING
HOW LIKELY ARE YOU TO NOD OFF OR FALL ASLEEP WHILE SITTING AND TALKING TO SOMEONE: WOULD NEVER DOZE
HOW LIKELY ARE YOU TO NOD OFF OR FALL ASLEEP IN A CAR, WHILE STOPPED FOR A FEW MINUTES IN TRAFFIC: WOULD NEVER DOZE

## 2024-01-17 ASSESSMENT — PAIN SCALES - GENERAL: PAINLEVEL: 0-NO PAIN

## 2024-01-17 NOTE — ASSESSMENT & PLAN NOTE
Good control and compliance at current settings still has excessive daytime sleepiness we will need to investigate further

## 2024-01-17 NOTE — ASSESSMENT & PLAN NOTE
Significant daytime sleepiness needs additional evaluation with PSG followed by MSLT testing PSG will be performed at 95th percentile CPAP pressure of 11 cm H2O.  Can titrate CPAP settings as needed to obtain adequate control of sleep apnea

## 2024-01-17 NOTE — ASSESSMENT & PLAN NOTE
Still experiencing dream enactment behavior with melatonin 5 mg nightly.  Recommended he needs to be more consistent with dosing taking every night and to increase dose to melatonin 10 mg p.o. nightly

## 2024-01-17 NOTE — PROGRESS NOTES
Subjective   Patient ID: Bakari Delarosa is a 58 y.o. male who presents for Sleep Apnea and Pap Adherence Followup.  HPI    Prior Sleep History:  10/18/2023: Office Visit: Dr. Ruperto Orellana:  Obstructive sleep apnea syndrome - Primary G47.33        Great compliance. Indicates insufficient sleep. So RIMA, less likely cause of sleepiness           Hypersomnolence G47.10       Discontinuing modafinil, he reports he is not responding to medication. I explained I am concerned about his insufficient sleep causing symptoms. We discussed concerns over psychosis and stimulants in sleep deprivation. 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 Short naps if needed.  If he is getting sufficient sleep 7-8 hours consistently over the next 3 months and still sleepy, then we discussed a need to get a PSG/MSLT        Current Sleep History:  Bed partner reports that he is still very sleepy during the daytime.  Will fall asleep around 7:30 PM wake up at 4 AM wake up.  Go to a different room fall back asleep until 8 AM since he has been off of work during vacation  He is taking melatonin 5 mg intermittently.  Wife reports he still has treatment active behavior.    A downloaded compliance report was reviewed and was interpreted by myself as follows:  > 4 hour compliance was 76.7 %, with an average use of 5 hours and 53 minutes, with a residual AHI 2.3 .     Bakari Delarosa reports  "good benefit from his device.    ESS: 8     Review of Systems  Review of systems negative except as per HPI  Objective   /74   Pulse 82   Ht 1.854 m (6' 1\")   Wt 118 kg (260 lb)   SpO2 98%   BMI 34.30 kg/m²    Physical Exam  PHYSICAL EXAM: GENERAL: alert pleasant and cooperative no acute distress  PSYCH EXAM: alert,oriented, in NAD with a full range of affect, normal behavior and no psychotic features    Lab Results   Component Value Date    IRON 157 10/03/2023    TIBC 380 10/03/2023    FERRITIN 137 07/12/2023        Assessment/Plan   Problem List Items Addressed This Visit             ICD-10-CM    Obstructive sleep apnea syndrome G47.33     Good control and compliance at current settings still has excessive daytime sleepiness we will need to investigate further         Hypersomnolence - Primary G47.10     Significant daytime sleepiness needs additional evaluation with PSG followed by MSLT testing PSG will be performed at 95th percentile CPAP pressure of 11 cm H2O.  Can titrate CPAP settings as needed to obtain adequate control of sleep apnea         Relevant Orders    In-Center Sleep Study (Sleep Provider Only)    Multiple sleep latency test (Sleep Provider Only)    Drug Screen, Urine With Reflex to Confirmation    REM sleep behavior disorder G47.52     Still experiencing dream enactment behavior with melatonin 5 mg nightly.  Recommended he needs to be more consistent with dosing taking every night and to increase dose to melatonin 10 mg p.o. nightly         Relevant Medications    melatonin 10 mg tablet            "

## 2024-01-23 ENCOUNTER — OFFICE VISIT (OUTPATIENT)
Dept: PRIMARY CARE | Facility: CLINIC | Age: 59
End: 2024-01-23
Payer: COMMERCIAL

## 2024-01-23 VITALS
HEART RATE: 106 BPM | SYSTOLIC BLOOD PRESSURE: 111 MMHG | BODY MASS INDEX: 36.02 KG/M2 | TEMPERATURE: 97.6 F | HEIGHT: 73 IN | WEIGHT: 271.8 LBS | DIASTOLIC BLOOD PRESSURE: 71 MMHG

## 2024-01-23 DIAGNOSIS — F33.42 RECURRENT MAJOR DEPRESSIVE DISORDER, IN FULL REMISSION (CMS-HCC): ICD-10-CM

## 2024-01-23 DIAGNOSIS — E34.9 TESTOSTERONE DEFICIENCY: ICD-10-CM

## 2024-01-23 DIAGNOSIS — G43.009 MIGRAINE WITHOUT AURA AND WITHOUT STATUS MIGRAINOSUS, NOT INTRACTABLE: ICD-10-CM

## 2024-01-23 DIAGNOSIS — E29.1 HYPOGONADISM IN MALE: Primary | ICD-10-CM

## 2024-01-23 PROCEDURE — 99214 OFFICE O/P EST MOD 30 MIN: CPT | Performed by: INTERNAL MEDICINE

## 2024-01-23 PROCEDURE — 1036F TOBACCO NON-USER: CPT | Performed by: INTERNAL MEDICINE

## 2024-01-23 RX ORDER — SERTRALINE HYDROCHLORIDE 100 MG/1
100 TABLET, FILM COATED ORAL DAILY
Qty: 90 TABLET | Refills: 3 | Status: SHIPPED | OUTPATIENT
Start: 2024-01-23 | End: 2024-03-05 | Stop reason: SDUPTHER

## 2024-01-23 RX ORDER — VERAPAMIL HYDROCHLORIDE 180 MG/1
180 CAPSULE, EXTENDED RELEASE ORAL NIGHTLY
Qty: 90 CAPSULE | Refills: 3 | Status: SHIPPED | OUTPATIENT
Start: 2024-01-23 | End: 2024-05-07 | Stop reason: SDUPTHER

## 2024-01-23 ASSESSMENT — ENCOUNTER SYMPTOMS
PALPITATIONS: 0
FEVER: 0
SHORTNESS OF BREATH: 0
POLYDIPSIA: 0
CHILLS: 0
COUGH: 0

## 2024-01-23 NOTE — PROGRESS NOTES
Subjective   Patient ID: Bakari Delarosa is a 58 y.o. male who presents for Follow-up.    58-year-old male presents for routine follow-up as recommended on chronic migraine disorder.  The combination of medications provided at his last visit has completely and totally helped the patient in terms of his chronic migraine management.  He has reduced down to 1 migraine day in the last 30 days.  When he did have a migraine he took the Ubrelvy with complete resolution of the migraine rapidly and no side effects.  He is very happy with the modified treatment options he wishes to continue with moving forward.  There have been no complications or side effects to his knowledge.    We discussed the results of his testosterone testing and its relationship to the patient's potential physical symptoms of chronic fatigue decree sense of wellbeing low libido among others.  I advised him about follow-up testing to confirm the diagnosis of low testosterone.  Based on the patient's symptoms this is a very likely diagnosis in this early blood result supports it.  We discussed testosterone therapy and the requirements of maintaining on it for the future should he qualify.  We discussed controlled substance agreements as well as risks and benefits of testosterone therapy.  Pending his follow-up blood work testosterone therapy may be initiated at that time.  He was advised for follow-up in March.  He does not have any history of prostate cancer or cardiovascular disease at this time.    Patient advised about management for major depression may be contributed by the low testosterone but given his incomplete response to initial doses of Zoloft we will titrate the medication up at this time for further care.       Review of Systems   Constitutional:  Negative for chills and fever.   Respiratory:  Negative for cough and shortness of breath.    Cardiovascular:  Negative for chest pain and palpitations.   Endocrine: Negative for polydipsia and  "polyuria.       Objective   /71 (BP Location: Left arm, Patient Position: Sitting, BP Cuff Size: Large adult)   Pulse 106   Temp 36.4 °C (97.6 °F) (Temporal)   Ht 1.854 m (6' 1\")   Wt 123 kg (271 lb 12.8 oz)   BMI 35.86 kg/m²     Physical Exam  Constitutional:       Appearance: Normal appearance.   HENT:      Head: Normocephalic and atraumatic.   Eyes:      Extraocular Movements: Extraocular movements intact.      Pupils: Pupils are equal, round, and reactive to light.   Neck:      Thyroid: No thyroid mass or thyromegaly.      Vascular: No carotid bruit.   Cardiovascular:      Rate and Rhythm: Normal rate and regular rhythm.      Heart sounds: No murmur heard.     No friction rub. No gallop.   Pulmonary:      Effort: No respiratory distress.      Breath sounds: No wheezing, rhonchi or rales.   Musculoskeletal:      Cervical back: Neck supple.      Right lower leg: No edema.      Left lower leg: No edema.   Lymphadenopathy:      Cervical: No cervical adenopathy.   Neurological:      Mental Status: He is alert.       Assessment/Plan   Problem List Items Addressed This Visit             ICD-10-CM    Migraine headache G43.909    Testosterone deficiency E34.9     Other Visit Diagnoses         Codes    Hypogonadism in male    -  Primary E29.1    Relevant Orders    Testosterone    FSH    Luteinizing hormone    Recurrent major depressive disorder, in full remission (CMS/[facility])     F33.42    Relevant Medications    sertraline (Zoloft) 100 mg tablet               "

## 2024-02-01 ENCOUNTER — LAB (OUTPATIENT)
Dept: LAB | Facility: LAB | Age: 59
End: 2024-02-01
Payer: COMMERCIAL

## 2024-02-01 DIAGNOSIS — E29.1 HYPOGONADISM IN MALE: ICD-10-CM

## 2024-02-01 LAB
FSH SERPL-ACNC: 6.6 IU/L
LH SERPL-ACNC: 5.8 IU/L
TESTOST SERPL-MCNC: 412 NG/DL (ref 240–1000)

## 2024-02-01 PROCEDURE — 83002 ASSAY OF GONADOTROPIN (LH): CPT

## 2024-02-01 PROCEDURE — 83001 ASSAY OF GONADOTROPIN (FSH): CPT

## 2024-02-01 PROCEDURE — 84403 ASSAY OF TOTAL TESTOSTERONE: CPT

## 2024-02-01 PROCEDURE — 36415 COLL VENOUS BLD VENIPUNCTURE: CPT

## 2024-03-05 ENCOUNTER — OFFICE VISIT (OUTPATIENT)
Dept: PRIMARY CARE | Facility: CLINIC | Age: 59
End: 2024-03-05
Payer: COMMERCIAL

## 2024-03-05 VITALS
HEART RATE: 101 BPM | SYSTOLIC BLOOD PRESSURE: 116 MMHG | TEMPERATURE: 98.8 F | BODY MASS INDEX: 34.73 KG/M2 | WEIGHT: 263.2 LBS | DIASTOLIC BLOOD PRESSURE: 76 MMHG

## 2024-03-05 DIAGNOSIS — G43.009 MIGRAINE WITHOUT AURA AND WITHOUT STATUS MIGRAINOSUS, NOT INTRACTABLE: ICD-10-CM

## 2024-03-05 DIAGNOSIS — F33.42 RECURRENT MAJOR DEPRESSIVE DISORDER, IN FULL REMISSION (CMS-HCC): Primary | ICD-10-CM

## 2024-03-05 DIAGNOSIS — E34.9 TESTOSTERONE DEFICIENCY: ICD-10-CM

## 2024-03-05 PROCEDURE — 99214 OFFICE O/P EST MOD 30 MIN: CPT | Performed by: INTERNAL MEDICINE

## 2024-03-05 PROCEDURE — 1036F TOBACCO NON-USER: CPT | Performed by: INTERNAL MEDICINE

## 2024-03-05 RX ORDER — SERTRALINE HYDROCHLORIDE 100 MG/1
150 TABLET, FILM COATED ORAL DAILY
Qty: 135 TABLET | Refills: 3 | Status: SHIPPED | OUTPATIENT
Start: 2024-03-05 | End: 2024-05-07 | Stop reason: SDUPTHER

## 2024-03-05 ASSESSMENT — PATIENT HEALTH QUESTIONNAIRE - PHQ9
5. POOR APPETITE OR OVEREATING: NEARLY EVERY DAY
8. MOVING OR SPEAKING SO SLOWLY THAT OTHER PEOPLE COULD HAVE NOTICED. OR THE OPPOSITE, BEING SO FIGETY OR RESTLESS THAT YOU HAVE BEEN MOVING AROUND A LOT MORE THAN USUAL: NOT AT ALL
SUM OF ALL RESPONSES TO PHQ9 QUESTIONS 1 AND 2: 2
7. TROUBLE CONCENTRATING ON THINGS, SUCH AS READING THE NEWSPAPER OR WATCHING TELEVISION: SEVERAL DAYS
2. FEELING DOWN, DEPRESSED OR HOPELESS: NOT AT ALL
4. FEELING TIRED OR HAVING LITTLE ENERGY: NEARLY EVERY DAY
SUM OF ALL RESPONSES TO PHQ QUESTIONS 1-9: 11
9. THOUGHTS THAT YOU WOULD BE BETTER OFF DEAD, OR OF HURTING YOURSELF: NOT AT ALL
1. LITTLE INTEREST OR PLEASURE IN DOING THINGS: MORE THAN HALF THE DAYS
6. FEELING BAD ABOUT YOURSELF - OR THAT YOU ARE A FAILURE OR HAVE LET YOURSELF OR YOUR FAMILY DOWN: NOT AT ALL
3. TROUBLE FALLING OR STAYING ASLEEP OR SLEEPING TOO MUCH: MORE THAN HALF THE DAYS

## 2024-03-05 ASSESSMENT — ENCOUNTER SYMPTOMS
SHORTNESS OF BREATH: 0
POLYDIPSIA: 0
PALPITATIONS: 0
COUGH: 0
FEVER: 0
CHILLS: 0

## 2024-03-05 ASSESSMENT — PAIN SCALES - GENERAL: PAINLEVEL: 0-NO PAIN

## 2024-03-05 NOTE — PROGRESS NOTES
Good to hear well, but up in Subjective   Patient ID: Bakari Delarosa is a 58 y.o. male who presents for Follow-up.    Patient presents for follow-up of major depressive disorder there have been perceived moderate benefit from the adjusted dose of his Zoloft we will adjust further based on the patient's current experience of symptoms and persistent certainty symptoms like loss of interest or pleasure in doing things he typically enjoys.  Otherwise I would consider the adjustment thus far to be a success and there have been no considerable side effects to date that would prevent us from doing so.  His testosterone evaluation came up short in terms of having abnormal readings on 2 test we will continue to monitor this because I do have concerns about the potential for testosterone contributing to the patient's energy levels moving forward if we are seeing signs of persistent or recurrent hypogonadism we will reevaluate for consideration of medication but at this time we will move forward without it.  I have ordered follow-up testosterone testing for before her next visit.         Review of Systems   Constitutional:  Negative for chills and fever.   Respiratory:  Negative for cough and shortness of breath.    Cardiovascular:  Negative for chest pain and palpitations.   Endocrine: Negative for polydipsia and polyuria.       Objective   /76   Pulse 101   Temp 37.1 °C (98.8 °F)   Wt 119 kg (263 lb 3.2 oz)   BMI 34.73 kg/m²     Physical Exam  Constitutional:       Appearance: Normal appearance.   HENT:      Head: Normocephalic and atraumatic.   Eyes:      Extraocular Movements: Extraocular movements intact.      Pupils: Pupils are equal, round, and reactive to light.   Neck:      Thyroid: No thyroid mass or thyromegaly.      Vascular: No carotid bruit.   Cardiovascular:      Rate and Rhythm: Normal rate and regular rhythm.      Heart sounds: No murmur heard.     No friction rub. No gallop.   Pulmonary:       Effort: No respiratory distress.      Breath sounds: No wheezing, rhonchi or rales.   Musculoskeletal:      Cervical back: Neck supple.      Right lower leg: No edema.      Left lower leg: No edema.   Lymphadenopathy:      Cervical: No cervical adenopathy.   Neurological:      Mental Status: He is alert.         Assessment/Plan   Problem List Items Addressed This Visit             ICD-10-CM    Migraine headache G43.909    Relevant Medications    ubrogepant (Ubrelvy) 100 mg tablet tablet    Testosterone deficiency E34.9    Relevant Orders    Testosterone, total and free     Other Visit Diagnoses         Codes    Recurrent major depressive disorder, in full remission (CMS/[facility])    -  Primary F33.42    Relevant Medications    sertraline (Zoloft) 100 mg tablet

## 2024-03-12 ENCOUNTER — APPOINTMENT (OUTPATIENT)
Dept: SLEEP MEDICINE | Facility: CLINIC | Age: 59
End: 2024-03-12
Payer: COMMERCIAL

## 2024-03-13 ENCOUNTER — APPOINTMENT (OUTPATIENT)
Dept: SLEEP MEDICINE | Facility: CLINIC | Age: 59
End: 2024-03-13
Payer: COMMERCIAL

## 2024-03-19 ENCOUNTER — APPOINTMENT (OUTPATIENT)
Dept: PRIMARY CARE | Facility: CLINIC | Age: 59
End: 2024-03-19
Payer: COMMERCIAL

## 2024-03-21 ENCOUNTER — PROCEDURE VISIT (OUTPATIENT)
Dept: SLEEP MEDICINE | Facility: CLINIC | Age: 59
End: 2024-03-21
Payer: COMMERCIAL

## 2024-03-21 DIAGNOSIS — G47.10 HYPERSOMNOLENCE: ICD-10-CM

## 2024-03-21 DIAGNOSIS — G47.33 OBSTRUCTIVE SLEEP APNEA (ADULT) (PEDIATRIC): ICD-10-CM

## 2024-03-21 DIAGNOSIS — G47.61 PERIODIC LIMB MOVEMENT DISORDER: ICD-10-CM

## 2024-03-21 DIAGNOSIS — G47.52 REM SLEEP BEHAVIOR DISORDER: ICD-10-CM

## 2024-03-21 PROCEDURE — 95811 POLYSOM 6/>YRS CPAP 4/> PARM: CPT | Performed by: INTERNAL MEDICINE

## 2024-03-22 ENCOUNTER — CLINICAL SUPPORT (OUTPATIENT)
Dept: SLEEP MEDICINE | Facility: CLINIC | Age: 59
End: 2024-03-22
Payer: COMMERCIAL

## 2024-03-22 ENCOUNTER — LAB (OUTPATIENT)
Dept: LAB | Facility: LAB | Age: 59
End: 2024-03-22
Payer: COMMERCIAL

## 2024-03-22 VITALS
DIASTOLIC BLOOD PRESSURE: 74 MMHG | BODY MASS INDEX: 34.48 KG/M2 | WEIGHT: 260.14 LBS | RESPIRATION RATE: 16 BRPM | HEIGHT: 73 IN | OXYGEN SATURATION: 96 % | SYSTOLIC BLOOD PRESSURE: 130 MMHG

## 2024-03-22 DIAGNOSIS — G47.10 HYPERSOMNOLENCE: ICD-10-CM

## 2024-03-22 DIAGNOSIS — G47.10 HYPERSOMNIA, UNSPECIFIED: Primary | ICD-10-CM

## 2024-03-22 DIAGNOSIS — G47.33 OBSTRUCTIVE SLEEP APNEA (ADULT) (PEDIATRIC): ICD-10-CM

## 2024-03-22 LAB
AMPHETAMINES UR QL SCN: NORMAL
BARBITURATES UR QL SCN: NORMAL
BENZODIAZ UR QL SCN: NORMAL
BZE UR QL SCN: NORMAL
CANNABINOIDS UR QL SCN: NORMAL
FENTANYL+NORFENTANYL UR QL SCN: NORMAL
METHADONE UR QL SCN: NORMAL
OPIATES UR QL SCN: NORMAL
OXYCODONE+OXYMORPHONE UR QL SCN: NORMAL
PCP UR QL SCN: NORMAL

## 2024-03-22 PROCEDURE — 95805 MULTIPLE SLEEP LATENCY TEST: CPT | Performed by: INTERNAL MEDICINE

## 2024-03-22 PROCEDURE — 80307 DRUG TEST PRSMV CHEM ANLYZR: CPT

## 2024-03-22 ASSESSMENT — SLEEP AND FATIGUE QUESTIONNAIRES
HOW LIKELY ARE YOU TO NOD OFF OR FALL ASLEEP WHILE SITTING QUIETLY AFTER LUNCH WITHOUT ALCOHOL: WOULD NEVER DOZE
HOW LIKELY ARE YOU TO NOD OFF OR FALL ASLEEP WHILE SITTING AND READING: MODERATE CHANCE OF DOZING
HOW LIKELY ARE YOU TO NOD OFF OR FALL ASLEEP WHILE WATCHING TV: MODERATE CHANCE OF DOZING
HOW LIKELY ARE YOU TO NOD OFF OR FALL ASLEEP WHILE SITTING AND TALKING TO SOMEONE: WOULD NEVER DOZE
HOW LIKELY ARE YOU TO NOD OFF OR FALL ASLEEP IN A CAR, WHILE STOPPED FOR A FEW MINUTES IN TRAFFIC: WOULD NEVER DOZE
HOW LIKELY ARE YOU TO NOD OFF OR FALL ASLEEP WHEN YOU ARE A PASSENGER IN A CAR FOR AN HOUR WITHOUT A BREAK: WOULD NEVER DOZE
SITING INACTIVE IN A PUBLIC PLACE LIKE A CLASS ROOM OR A MOVIE THEATER: WOULD NEVER DOZE
HOW LIKELY ARE YOU TO NOD OFF OR FALL ASLEEP WHILE LYING DOWN TO REST IN THE AFTERNOON WHEN CIRCUMSTANCES PERMIT: MODERATE CHANCE OF DOZING
ESS-CHAD TOTAL SCORE: 6

## 2024-03-22 NOTE — PROGRESS NOTES
[facility] TECH NOTE:     Patient: Bakari Delarosa   MRN//AGE: [id number]  1965  58 y.o.   Technologist: Chong Ramirez   Room: 4   Service Date: 3/22/2024        Sleep Testing Location: [facility]:     TECHNOLOGIST SLEEP STUDY PROCEDURE NOTE:   This sleep study is being conducted according to the policies and procedures outlined by the AAS accreditation standards.  The sleep study procedure and processes involved during this appointment was explained to the patient/patient’s family, questions were answered. The patient/family verbalized understanding.      The patient is a 58 y.o. year old male scheduled for aMSLT/MWT with montage of:  PSG . he arrived for his appointment.      The study that was ultimately completed was aMSLT/MWT with montage of:  PSG .    The full study Was completed.  Patient questionnaires completed?: yes     Consents signed? yes      Brief Study observations: Patient yawning between naps.     Deviation to order/protocol and reason: None      If PAP, which was preferred mask/pressure/mode: CPAP set at 11cwp using Jaimes Paykel Fuad under the nose FFM  large used for all naps.      Other:None    After the procedure, the patient/family was informed to ensure followup with ordering clinician for testing results.      Technologist: Chong Ramirez

## 2024-03-22 NOTE — PROGRESS NOTES
[facility] TECH NOTE:     Patient: Bakari Delarosa   MRN//AGE: [id number]  1965  58 y.o.   Technologist: Michael Pena   Room: 4   Service Date: 3/22/2024        Sleep Testing Location: [facility]  Neck Cir 44 cm  [place]: 6    TECHNOLOGIST SLEEP STUDY PROCEDURE NOTE:   This sleep study is being conducted according to the policies and procedures outlined by the AAS accreditation standards.  The sleep study procedure and processes involved during this appointment was explained to the patient/patient’s family, questions were answered. The patient/family verbalized understanding.      The patient is a 58 y.o. year old male scheduled for a CPAP titration with the additon of arm leads and MSLT study . he arrived for his appointment.      The study that was ultimately completed was a CPAP titration with the addition of arm leads and MSLT study .    The full study Was completed.  Patient questionnaires completed?: yes     Consents signed? yes    Initial Fall Risk Screening:     Bakari has not fallen in the last 6 months.  Bakari does not have a fear of falling. He does not need assistance with sitting, standing, or walking. he does not need assistance walking in his home. he does not need assistance in an unfamiliar setting. The patient is notusing an assistive device.     Brief Study observations: This is a 58 year-old male here for a CPAP/MSLT Study with arm.      Deviation to order/protocol and reason: None      If PAP, which was preferred mask/pressure/mode: Theodore Merida under the nose full face mask size large      Other:None    After the procedure, the patient/family was informed to ensure followup with ordering clinician for testing results.      Technologist: Michael Pena

## 2024-04-11 ENCOUNTER — APPOINTMENT (OUTPATIENT)
Dept: SLEEP MEDICINE | Facility: CLINIC | Age: 59
End: 2024-04-11
Payer: COMMERCIAL

## 2024-04-29 ENCOUNTER — LAB (OUTPATIENT)
Dept: LAB | Facility: LAB | Age: 59
End: 2024-04-29
Payer: COMMERCIAL

## 2024-04-29 DIAGNOSIS — E34.9 TESTOSTERONE DEFICIENCY: ICD-10-CM

## 2024-04-29 PROCEDURE — 36415 COLL VENOUS BLD VENIPUNCTURE: CPT

## 2024-04-29 PROCEDURE — 84402 ASSAY OF FREE TESTOSTERONE: CPT

## 2024-05-03 LAB
TESTOSTERONE FREE (CHAN): 60.3 PG/ML (ref 35–155)
TESTOSTERONE,TOTAL,LC-MS/MS: 450 NG/DL (ref 250–1100)

## 2024-05-07 ENCOUNTER — OFFICE VISIT (OUTPATIENT)
Dept: PRIMARY CARE | Facility: CLINIC | Age: 59
End: 2024-05-07
Payer: COMMERCIAL

## 2024-05-07 VITALS
TEMPERATURE: 97.7 F | HEART RATE: 96 BPM | DIASTOLIC BLOOD PRESSURE: 73 MMHG | SYSTOLIC BLOOD PRESSURE: 109 MMHG | WEIGHT: 260 LBS | BODY MASS INDEX: 34.31 KG/M2

## 2024-05-07 DIAGNOSIS — F33.42 RECURRENT MAJOR DEPRESSIVE DISORDER, IN FULL REMISSION (CMS-HCC): Primary | ICD-10-CM

## 2024-05-07 DIAGNOSIS — G43.009 MIGRAINE WITHOUT AURA AND WITHOUT STATUS MIGRAINOSUS, NOT INTRACTABLE: ICD-10-CM

## 2024-05-07 DIAGNOSIS — H00.16 ACUTE CHALAZION OF LEFT EYE: ICD-10-CM

## 2024-05-07 PROCEDURE — 99214 OFFICE O/P EST MOD 30 MIN: CPT | Performed by: INTERNAL MEDICINE

## 2024-05-07 PROCEDURE — 1036F TOBACCO NON-USER: CPT | Performed by: INTERNAL MEDICINE

## 2024-05-07 RX ORDER — VERAPAMIL HYDROCHLORIDE 240 MG/1
240 CAPSULE, EXTENDED RELEASE ORAL NIGHTLY
Qty: 90 CAPSULE | Refills: 3 | Status: SHIPPED | OUTPATIENT
Start: 2024-05-07 | End: 2025-05-02

## 2024-05-07 RX ORDER — SERTRALINE HYDROCHLORIDE 100 MG/1
200 TABLET, FILM COATED ORAL DAILY
Qty: 180 TABLET | Refills: 3 | Status: SHIPPED | OUTPATIENT
Start: 2024-05-07 | End: 2025-05-07

## 2024-05-07 RX ORDER — TOBRAMYCIN 3 MG/ML
1 SOLUTION/ DROPS OPHTHALMIC EVERY 4 HOURS
Qty: 5 ML | Refills: 0 | Status: SHIPPED | OUTPATIENT
Start: 2024-05-07 | End: 2024-05-14

## 2024-05-07 ASSESSMENT — ENCOUNTER SYMPTOMS
SHORTNESS OF BREATH: 0
WEAKNESS: 0
FEVER: 0
DIFFICULTY URINATING: 0
ROS GI COMMENTS: NEGATIVE FOR STOOL INCONTINENCE
POLYDIPSIA: 0
COUGH: 0
PALPITATIONS: 0
CHILLS: 0
NUMBNESS: 0

## 2024-05-07 ASSESSMENT — PATIENT HEALTH QUESTIONNAIRE - PHQ9
6. FEELING BAD ABOUT YOURSELF - OR THAT YOU ARE A FAILURE OR HAVE LET YOURSELF OR YOUR FAMILY DOWN: NOT AT ALL
9. THOUGHTS THAT YOU WOULD BE BETTER OFF DEAD, OR OF HURTING YOURSELF: NOT AT ALL
1. LITTLE INTEREST OR PLEASURE IN DOING THINGS: SEVERAL DAYS
3. TROUBLE FALLING OR STAYING ASLEEP OR SLEEPING TOO MUCH: NEARLY EVERY DAY
8. MOVING OR SPEAKING SO SLOWLY THAT OTHER PEOPLE COULD HAVE NOTICED. OR THE OPPOSITE, BEING SO FIGETY OR RESTLESS THAT YOU HAVE BEEN MOVING AROUND A LOT MORE THAN USUAL: MORE THAN HALF THE DAYS
2. FEELING DOWN, DEPRESSED OR HOPELESS: SEVERAL DAYS
SUM OF ALL RESPONSES TO PHQ QUESTIONS 1-9: 14
SUM OF ALL RESPONSES TO PHQ9 QUESTIONS 1 AND 2: 2
4. FEELING TIRED OR HAVING LITTLE ENERGY: NEARLY EVERY DAY
5. POOR APPETITE OR OVEREATING: NEARLY EVERY DAY
7. TROUBLE CONCENTRATING ON THINGS, SUCH AS READING THE NEWSPAPER OR WATCHING TELEVISION: SEVERAL DAYS

## 2024-05-07 NOTE — PROGRESS NOTES
Subjective   Patient ID: Bakari Delarosa is a 58 y.o. male who presents for Follow-up.    58-year-old male presents today for routine follow-up on managing chronic fatigue and anxiety.  He completed a clinically study and has Follow-up in 3 weeks with the sleep specialist he follows with the results of that and further discussion.  He suffers from significant levels of chronic fatigue, of which we have been working to see if adjusting his antidepressant may have an impact in.  He does notice a moderate benefit from increasing the dose of the antidepressant and every respective managing his mood disorder except for chronic fatigue at this time.  We discussed making further adjustments to see if we could make any impact.  It is possible that the new sleep study will show significant concern disrupting his patient's sleep and we will continue to observe their recommendations and make adjustments according to the patient's response to the treatment plans.  At this time no I am advising for an adjusted dose patient was educated about signs and symptoms to monitor for and is agreeable to the medication management plan at this time.      In addition to that plan we followed up on the patient's chronic migraine disorder, which has been responding very well to the verapamil and magnesium for prevention of migraines.  Patient baseline was previously double-digit migraines per month but now he is down to 4-5 in a month controllable through the sumatriptan and Ubrelvy, although there is been a delay in the patient's refills of Ubrelvy due to insurance related issues.  All the patient can tolerate and benefit from the sumatriptan it causes side effects such as sedation and that is not always ideal to take this medication for him as the sedation can be problematic.  As such, the patient is a perfect candidate for nontriptan therapies such as Ubrelvy as an additional option and I am strongly advising for the use of it.  We also  discussed making adjustments to the patient's chronic medications for the prevention of migraine as there has been a wonderful improvement but I feel an adjustment here would be warranted to try to reduce his headache frequency days even further.  We will try titrating the verapamil further and continuing the magnesium.  Patient advised about potential dizziness or lightheadedness associated with the increase in and notify me if any concerns develop.             Review of Systems   Constitutional:  Negative for chills and fever.   Respiratory:  Negative for cough and shortness of breath.    Cardiovascular:  Negative for chest pain and palpitations.   Gastrointestinal:         Negative for stool incontinence    Endocrine: Negative for polydipsia and polyuria.   Genitourinary:  Negative for difficulty urinating.        Negative for urinary retention   Musculoskeletal:  Negative for gait problem.   Neurological:  Negative for weakness and numbness.       Objective   /73 (BP Location: Left arm, Patient Position: Sitting, BP Cuff Size: Large adult)   Pulse 96   Temp 36.5 °C (97.7 °F) (Temporal)   Wt 118 kg (260 lb)   BMI 34.31 kg/m²     Physical Exam  Constitutional:       Appearance: Normal appearance.   HENT:      Head: Normocephalic and atraumatic.   Eyes:      Extraocular Movements: Extraocular movements intact.      Pupils: Pupils are equal, round, and reactive to light.   Neck:      Thyroid: No thyroid mass or thyromegaly.   Cardiovascular:      Rate and Rhythm: Normal rate and regular rhythm.      Heart sounds: No murmur heard.     No friction rub. No gallop.   Pulmonary:      Effort: No respiratory distress.      Breath sounds: No wheezing, rhonchi or rales.   Musculoskeletal:      Cervical back: Neck supple.      Right lower leg: No edema.      Left lower leg: No edema.   Neurological:      Mental Status: He is alert.         Assessment/Plan   Problem List Items Addressed This Visit              ICD-10-CM    Migraine headache G43.909    Relevant Medications    verapamil ER (Veralan PM) 240 mg 24 hr capsule     Other Visit Diagnoses         Codes    Recurrent major depressive disorder, in full remission (CMS-HCC)    -  Primary F33.42    Relevant Medications    sertraline (Zoloft) 100 mg tablet    Acute chalazion of left eye     H00.16    Relevant Medications    tobramycin (Tobrex) 0.3 % ophthalmic solution

## 2024-05-22 ENCOUNTER — OFFICE VISIT (OUTPATIENT)
Dept: SLEEP MEDICINE | Facility: CLINIC | Age: 59
End: 2024-05-22
Payer: COMMERCIAL

## 2024-05-22 VITALS
OXYGEN SATURATION: 98 % | BODY MASS INDEX: 34.46 KG/M2 | HEART RATE: 83 BPM | WEIGHT: 260 LBS | DIASTOLIC BLOOD PRESSURE: 84 MMHG | SYSTOLIC BLOOD PRESSURE: 132 MMHG | HEIGHT: 73 IN

## 2024-05-22 DIAGNOSIS — G47.33 OBSTRUCTIVE SLEEP APNEA SYNDROME: ICD-10-CM

## 2024-05-22 DIAGNOSIS — G47.10 HYPERSOMNOLENCE: ICD-10-CM

## 2024-05-22 DIAGNOSIS — G47.52 REM SLEEP BEHAVIOR DISORDER: Primary | ICD-10-CM

## 2024-05-22 DIAGNOSIS — G25.81 RESTLESS LEG SYNDROME: ICD-10-CM

## 2024-05-22 PROCEDURE — G2211 COMPLEX E/M VISIT ADD ON: HCPCS | Performed by: INTERNAL MEDICINE

## 2024-05-22 PROCEDURE — 1036F TOBACCO NON-USER: CPT | Performed by: INTERNAL MEDICINE

## 2024-05-22 PROCEDURE — 99215 OFFICE O/P EST HI 40 MIN: CPT | Performed by: INTERNAL MEDICINE

## 2024-05-22 RX ORDER — PRAMIPEXOLE DIHYDROCHLORIDE 0.12 MG/1
0.12 TABLET ORAL SEE ADMIN INSTRUCTIONS
Qty: 140 TABLET | Refills: 0 | Status: SHIPPED | OUTPATIENT
Start: 2024-05-22 | End: 2024-07-21

## 2024-05-22 ASSESSMENT — SLEEP AND FATIGUE QUESTIONNAIRES
SLEEP_PROBLEM_INTERFERES_DAILY_ACTIVITIES: NOT AT ALL NOTICEABLE
DIFFICULTY_STAYING_ASLEEP: MODERATE
WORRIED_DISTRESSED_DUE_TO_SLEEP: NOT AT ALL NOTICEABLE
SLEEP_PROBLEM_NOTICEABLE_TO_OTHERS: MUCH
SATISFACTION_WITH_CURRENT_SLEEP_PATTERN: VERY SATISFIED
DIFFICULTY_FALLING_ASLEEP: SEVERE

## 2024-05-22 ASSESSMENT — PAIN SCALES - GENERAL: PAINLEVEL: 0-NO PAIN

## 2024-05-22 NOTE — ASSESSMENT & PLAN NOTE
"He had severe PLM-D with high arousal percentage of 32%. This is the likely contributing factor as to why he is not responding to PAP therapy and still having daytime sleepiness. We will utilize therapy for both his restless legs, PLM's and REM behavior disorder.  We will trial pramipexole with a gradual dose increase to minimize side effects.  We did discuss potential side effects including headache, nausea, vomiting, dizziness, blurry vision, compulsive behavior such as gambling or other compulsive behaviors.   I did explain to Ed and his spouse that it is possible that his sertraline is causing his RBD and RLS symptoms and that we may be medication \"stacking\" meaning that we are potentially treating a side effect of one medication to manage the symptoms that are significant enough as he is currently unable to come off the sertraline. We previously tried bupropion and it was unsuccessful  "

## 2024-05-22 NOTE — ASSESSMENT & PLAN NOTE
Will continue him on Auto-PAP it is effectively controlling his RIMA. He has very fragmented sleep according to his Diagnostic PSG. May consider sleep aid in the future

## 2024-05-22 NOTE — LETTER
May 22, 2024     Amor Vital MD  [address] [facility] [id number]    Patient: Bakari Delarosa   YOB: 1965   Date of Visit: 2024       Dear Dr. Amor Vital MD:    Thank you for referring Bakari Delarosa to me for evaluation. Below are my notes for this consultation.  If you have questions, please do not hesitate to call me. I look forward to following your patient along with you.       Sincerely,     Ruperto Orellana MD      CC: No Recipients  ______________________________________________________________________________________         Patient: Bakari Delarosa    [id number]  : 1965 -- AGE 58 y.o.    Provider: Ruperto Orellana MD     Location [facility]   Service Date: 2024              [facility]  Followup Visit Note        HISTORY OF PRESENT ILLNESS     The patient's referring provider is: No ref. provider found    HISTORY OF PRESENT ILLNESS   Bakari Delarosa is a 58 y.o. male who presents to a [facility] for followup.     The patient  has a past medical history of Personal history of other diseases of the digestive system, Personal history of other diseases of the nervous system and sense organs, Personal history of other endocrine, nutritional and metabolic disease, Restless leg syndrome, Sleep apnea, obstructive, Sleep walking disorder, and Snoring..    CURRENT HISTORY    On today's visit, Bakari is here to discuss the results of his PSG followed by MSLT.    PSG on CPAP on 3/21/2024.  Started 11 cm H2O and increased to 13 cm H2O.  He had severe sleep fragmentation.  His REM was delayed to the early morning.  PLMI 52.9 with 32% associated with arousals.  PLM AI 16.9.  He did exhibit REM sleep without atonia.  There were not any vocalizations or abnormal motor behaviors during this time    MSLT 3/22/2024: Mean sleep latency 16.8 with no sleep onset REM periods.  Sleep log determined  average sleep is 6 to 8 hours with napping behavior    He does endorse an urge to move his legs around, typically in the evening or bed. Felt like an uncontrolled itch. Currently not any better      ESS: 6       REVIEW OF SYSTEMS     REVIEW OF SYSTEMS  Review of Systems      ALLERGIES AND MEDICATIONS     ALLERGIES  Allergies   Allergen Reactions   • Bupropion Blurred vision and Other   • Ropinirole Dizziness   • Bupropion Hcl Other   • Respirol Unknown   • Ropinirole Hcl Dizziness       MEDICATIONS: He has a current medication list which includes the following prescription(s): allopurinol - Take 1 tablet (100 mg) by mouth once daily, allopurinol - Take 1 tablet (300 mg) by mouth once daily, atorvastatin - Take 1 tablet (40 mg) by mouth once daily, chlorthalidone - Take 1 tablet (25 mg) by mouth once daily, cholecalciferol - Take by mouth, magnesium chloride - Take 1 tablet (64 mg) by mouth once daily, potassium chloride cr - Take 1 tablet (20 mEq) by mouth once daily. Do not crush or chew, sertraline - Take 2 tablets (200 mg) by mouth once daily, sildenafil - Take 1 tablet (100 mg) by mouth once daily as needed for erectile dysfunction, sumatriptan - Take 1 tablet (100 mg) by mouth 1 time if needed for migraine for up to 108 doses, ubrogepant - Take 1 tablet (100 mg) by mouth if needed (first sign of migraine), verapamil er - Take 1 capsule (240 mg) by mouth once daily at bedtime. Do not crush or chew, zinc gluconate - Take by mouth once daily, and pramipexole - Take 1 tablet (0.125 mg) by mouth see administration instructions. Titrate dose. Start with 1 tab QHS  x 2 weeks, then 2 tablets  QHS x 2 weeks, then 3 tablets QHS x 2 weeks, then 4 tablets QHS.    PAST MEDICAL HISTORY : He  has a past medical history of Personal history of other diseases of the digestive system, Personal history of other diseases of the nervous system and sense organs, Personal history of other endocrine, nutritional and metabolic disease,  "Restless leg syndrome, Sleep apnea, obstructive, Sleep walking disorder, and Snoring.    PAST SURGICAL HISTORY: He  has a past surgical history that includes Other surgical history (01/04/2022) and Other surgical history (01/04/2022).     FAMILY HISTORY: No changes since previous visit. Otherwise non-contributory as charted.       SOCIAL HISTORY  He  reports that he quit smoking about 11 years ago. His smoking use included cigarettes. He started smoking about 44 years ago. He has a 33 pack-year smoking history. He quit smokeless tobacco use about 26 years ago. He reports current alcohol use of about 10.0 standard drinks of alcohol per week. He reports that he does not currently use drugs after having used the following drugs: Marijuana.       PHYSICAL EXAM     VITAL SIGNS: /84   Pulse 83   Ht 1.854 m (6' 1\")   Wt 118 kg (260 lb)   SpO2 98%   BMI 34.30 kg/m²      CURRENT WEIGHT: [unfilled]  BMI: [unfilled]  PREVIOUS WEIGHTS:  Wt Readings from Last 3 Encounters:   05/22/24 118 kg (260 lb)   05/07/24 118 kg (260 lb)   03/22/24 118 kg (260 lb 2.3 oz)       Physical Exam  PHYSICAL EXAM: GENERAL: alert pleasant and cooperative no acute distress  PSYCH EXAM: alert,oriented, in NAD with a full range of affect, normal behavior and no psychotic features      RESULTS/DATA     Iron (ug/dL)   Date Value   10/03/2023 157   07/12/2023 177 (H)     % Saturation (%)   Date Value   10/03/2023 41     Iron Saturation (%)   Date Value   07/12/2023 40     TIBC (ug/dL)   Date Value   10/03/2023 380   07/12/2023 439     Ferritin (ug/L)   Date Value   07/12/2023 137       PAP Adherence  A PAP adherence download was obtained and data was reviewed personally today in clinic. (see scanned document in EPIC)  COMPLIANCE REPORT RESULTS: Date Range:  4/22/2024 - 5/21/2024 Days used: 30 > 4 hours usage:  86.7 % Average usage hours on dates used: 6.75 hours Residual AHI 2.3 on AutoPap 4 to 16 cm H2O.  Average pressure 12.5 cm " H2O      ASSESSMENT/PLAN     Mr. Delarosa is a 58 y.o. male and  has a past medical history of Personal history of other diseases of the digestive system, Personal history of other diseases of the nervous system and sense organs, Personal history of other endocrine, nutritional and metabolic disease, Restless leg syndrome, Sleep apnea, obstructive, Sleep walking disorder, and Snoring. He returns in followup to the [facility] for their sleep study results.    Problem List and Orders  Problem List Items Addressed This Visit             ICD-10-CM    Obstructive sleep apnea syndrome G47.33     Will continue him on Auto-PAP it is effectively controlling his RIMA. He has very fragmented sleep according to his Diagnostic PSG. May consider sleep aid in the future         Hypersomnolence G47.10     He does not have a clinically significant hypersomnolence disorder such as narcolepsy or idiopathic hypersomolence. I believe this is due to a periodic limb movement disorder that is affecting his sleep quality.  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  He previously has not tolerated ropinirole due to dizziness.  He also has not tolerated temazepam or clonazepam.  Can consider gabapentin or other benzodiazepine or nonbenzodiazepine.  Not compliant with melatonin         Relevant Medications    pramipexole (Mirapex) 0.125 mg tablet    Restless leg syndrome G25.81     He had severe PLM-D with high arousal percentage of 32%. This is the likely contributing factor as to why he is not responding to PAP therapy and still having daytime sleepiness. We will utilize therapy for both his restless legs, PLM's and REM behavior disorder.  We will trial pramipexole with a gradual dose increase to minimize side effects.  We did discuss potential side effects including headache, nausea, vomiting, dizziness, blurry vision,  "compulsive behavior such as gambling or other compulsive behaviors.   I did explain to Ed and his spouse that it is possible that his sertraline is causing his RBD and RLS symptoms and that we may be medication \"stacking\" meaning that we are potentially treating a side effect of one medication to manage the symptoms that are significant enough as he is currently unable to come off the sertraline. We previously tried bupropion and it was unsuccessful         Relevant Medications    pramipexole (Mirapex) 0.125 mg tablet            "

## 2024-05-22 NOTE — ASSESSMENT & PLAN NOTE
He does not have a clinically significant hypersomnolence disorder such as narcolepsy or idiopathic hypersomolence. I believe this is due to a periodic limb movement disorder that is affecting his sleep quality.  Likely also related to his REM behavior disorder

## 2024-05-22 NOTE — ASSESSMENT & PLAN NOTE
Will trial pramipexole.  He previously has not tolerated ropinirole due to dizziness.  He also has not tolerated temazepam or clonazepam.  Can consider gabapentin or other benzodiazepine or nonbenzodiazepine.  Not compliant with melatonin

## 2024-05-22 NOTE — PROGRESS NOTES
Patient: Bakari Delarosa    [id number]  : 1965 -- AGE 58 y.o.    Provider: Ruperto Orellana MD     Location [facility]   Service Date: 2024              [facility]  Followup Visit Note        HISTORY OF PRESENT ILLNESS     The patient's referring provider is: No ref. provider found    HISTORY OF PRESENT ILLNESS   Bakari Delarosa is a 58 y.o. male who presents to a [facility] for followup.     The patient  has a past medical history of Personal history of other diseases of the digestive system, Personal history of other diseases of the nervous system and sense organs, Personal history of other endocrine, nutritional and metabolic disease, Restless leg syndrome, Sleep apnea, obstructive, Sleep walking disorder, and Snoring..    CURRENT HISTORY    On today's visit, Bakari is here to discuss the results of his PSG followed by MSLT.    PSG on CPAP on 3/21/2024.  Started 11 cm H2O and increased to 13 cm H2O.  He had severe sleep fragmentation.  His REM was delayed to the early morning.  PLMI 52.9 with 32% associated with arousals.  PLM AI 16.9.  He did exhibit REM sleep without atonia.  There were not any vocalizations or abnormal motor behaviors during this time    MSLT 3/22/2024: Mean sleep latency 16.8 with no sleep onset REM periods.  Sleep log determined average sleep is 6 to 8 hours with napping behavior    He does endorse an urge to move his legs around, typically in the evening or bed. Felt like an uncontrolled itch. Currently not any better      ESS: 6       REVIEW OF SYSTEMS     REVIEW OF SYSTEMS  Review of Systems      ALLERGIES AND MEDICATIONS     ALLERGIES  Allergies   Allergen Reactions    Bupropion Blurred vision and Other    Ropinirole Dizziness    Bupropion Hcl Other    Respirol Unknown    Ropinirole Hcl Dizziness       MEDICATIONS: He has a current medication list which includes the following  prescription(s): allopurinol - Take 1 tablet (100 mg) by mouth once daily, allopurinol - Take 1 tablet (300 mg) by mouth once daily, atorvastatin - Take 1 tablet (40 mg) by mouth once daily, chlorthalidone - Take 1 tablet (25 mg) by mouth once daily, cholecalciferol - Take by mouth, magnesium chloride - Take 1 tablet (64 mg) by mouth once daily, potassium chloride cr - Take 1 tablet (20 mEq) by mouth once daily. Do not crush or chew, sertraline - Take 2 tablets (200 mg) by mouth once daily, sildenafil - Take 1 tablet (100 mg) by mouth once daily as needed for erectile dysfunction, sumatriptan - Take 1 tablet (100 mg) by mouth 1 time if needed for migraine for up to 108 doses, ubrogepant - Take 1 tablet (100 mg) by mouth if needed (first sign of migraine), verapamil er - Take 1 capsule (240 mg) by mouth once daily at bedtime. Do not crush or chew, zinc gluconate - Take by mouth once daily, and pramipexole - Take 1 tablet (0.125 mg) by mouth see administration instructions. Titrate dose. Start with 1 tab QHS  x 2 weeks, then 2 tablets  QHS x 2 weeks, then 3 tablets QHS x 2 weeks, then 4 tablets QHS.    PAST MEDICAL HISTORY : He  has a past medical history of Personal history of other diseases of the digestive system, Personal history of other diseases of the nervous system and sense organs, Personal history of other endocrine, nutritional and metabolic disease, Restless leg syndrome, Sleep apnea, obstructive, Sleep walking disorder, and Snoring.    PAST SURGICAL HISTORY: He  has a past surgical history that includes Other surgical history (01/04/2022) and Other surgical history (01/04/2022).     FAMILY HISTORY: No changes since previous visit. Otherwise non-contributory as charted.       SOCIAL HISTORY  He  reports that he quit smoking about 11 years ago. His smoking use included cigarettes. He started smoking about 44 years ago. He has a 33 pack-year smoking history. He quit smokeless tobacco use about 26 years ago.  "He reports current alcohol use of about 10.0 standard drinks of alcohol per week. He reports that he does not currently use drugs after having used the following drugs: Marijuana.       PHYSICAL EXAM     VITAL SIGNS: /84   Pulse 83   Ht 1.854 m (6' 1\")   Wt 118 kg (260 lb)   SpO2 98%   BMI 34.30 kg/m²      CURRENT WEIGHT: [unfilled]  BMI: [unfilled]  PREVIOUS WEIGHTS:  Wt Readings from Last 3 Encounters:   05/22/24 118 kg (260 lb)   05/07/24 118 kg (260 lb)   03/22/24 118 kg (260 lb 2.3 oz)       Physical Exam  PHYSICAL EXAM: GENERAL: alert pleasant and cooperative no acute distress  PSYCH EXAM: alert,oriented, in NAD with a full range of affect, normal behavior and no psychotic features      RESULTS/DATA     Iron (ug/dL)   Date Value   10/03/2023 157   07/12/2023 177 (H)     % Saturation (%)   Date Value   10/03/2023 41     Iron Saturation (%)   Date Value   07/12/2023 40     TIBC (ug/dL)   Date Value   10/03/2023 380   07/12/2023 439     Ferritin (ug/L)   Date Value   07/12/2023 137       PAP Adherence  A PAP adherence download was obtained and data was reviewed personally today in clinic. (see scanned document in EPIC)  COMPLIANCE REPORT RESULTS: Date Range:  4/22/2024 - 5/21/2024 Days used: 30 > 4 hours usage:  86.7 % Average usage hours on dates used: 6.75 hours Residual AHI 2.3 on AutoPap 4 to 16 cm H2O.  Average pressure 12.5 cm H2O      ASSESSMENT/PLAN     Mr. Delarosa is a 58 y.o. male and  has a past medical history of Personal history of other diseases of the digestive system, Personal history of other diseases of the nervous system and sense organs, Personal history of other endocrine, nutritional and metabolic disease, Restless leg syndrome, Sleep apnea, obstructive, Sleep walking disorder, and Snoring. He returns in followup to the [facility] for their sleep study results.    Problem List and Orders  Problem List Items Addressed This Visit             ICD-10-CM    " "Obstructive sleep apnea syndrome G47.33     Will continue him on Auto-PAP it is effectively controlling his RIMA. He has very fragmented sleep according to his Diagnostic PSG. May consider sleep aid in the future         Hypersomnolence G47.10     He does not have a clinically significant hypersomnolence disorder such as narcolepsy or idiopathic hypersomolence. I believe this is due to a periodic limb movement disorder that is affecting his sleep quality.  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  He previously has not tolerated ropinirole due to dizziness.  He also has not tolerated temazepam or clonazepam.  Can consider gabapentin or other benzodiazepine or nonbenzodiazepine.  Not compliant with melatonin         Relevant Medications    pramipexole (Mirapex) 0.125 mg tablet    Restless leg syndrome G25.81     He had severe PLM-D with high arousal percentage of 32%. This is the likely contributing factor as to why he is not responding to PAP therapy and still having daytime sleepiness. We will utilize therapy for both his restless legs, PLM's and REM behavior disorder.  We will trial pramipexole with a gradual dose increase to minimize side effects.  We did discuss potential side effects including headache, nausea, vomiting, dizziness, blurry vision, compulsive behavior such as gambling or other compulsive behaviors.   I did explain to Ed and his spouse that it is possible that his sertraline is causing his RBD and RLS symptoms and that we may be medication \"stacking\" meaning that we are potentially treating a side effect of one medication to manage the symptoms that are significant enough as he is currently unable to come off the sertraline. We previously tried bupropion and it was unsuccessful         Relevant Medications    pramipexole (Mirapex) 0.125 mg tablet            "

## 2024-05-22 NOTE — PATIENT INSTRUCTIONS
Titrate dose. Start with 1 tablet at bedtime  x 2 weeks, then 2 tablets at bedtime x 2 weeks, then 3 tablets at bedtime x 2 weeks, then 4 tablets at bedtime. Increase until significant tolerance of medication is achieved. When you need

## 2024-06-17 DIAGNOSIS — G47.52 REM SLEEP BEHAVIOR DISORDER: Primary | ICD-10-CM

## 2024-06-17 RX ORDER — ZOLPIDEM TARTRATE 5 MG/1
5 TABLET ORAL NIGHTLY PRN
Qty: 14 TABLET | Refills: 0 | Status: SHIPPED | OUTPATIENT
Start: 2024-06-17 | End: 2024-07-01

## 2024-07-16 DIAGNOSIS — G43.009 MIGRAINE WITHOUT AURA AND WITHOUT STATUS MIGRAINOSUS, NOT INTRACTABLE: ICD-10-CM

## 2024-07-16 RX ORDER — MAGNESIUM CHLORIDE 64 MG
64 TABLET, DELAYED RELEASE (ENTERIC COATED) ORAL DAILY
Qty: 90 TABLET | Refills: 3 | Status: SHIPPED | OUTPATIENT
Start: 2024-07-16 | End: 2025-07-16

## 2024-08-13 ENCOUNTER — APPOINTMENT (OUTPATIENT)
Dept: PRIMARY CARE | Facility: CLINIC | Age: 59
End: 2024-08-13
Payer: COMMERCIAL

## 2024-08-13 VITALS
HEART RATE: 102 BPM | BODY MASS INDEX: 34.37 KG/M2 | WEIGHT: 260.5 LBS | SYSTOLIC BLOOD PRESSURE: 123 MMHG | DIASTOLIC BLOOD PRESSURE: 80 MMHG | TEMPERATURE: 97 F

## 2024-08-13 DIAGNOSIS — E34.9 TESTOSTERONE DEFICIENCY: Primary | ICD-10-CM

## 2024-08-13 DIAGNOSIS — R53.82 CHRONIC FATIGUE: ICD-10-CM

## 2024-08-13 DIAGNOSIS — N20.0 CALCULUS OF KIDNEY: ICD-10-CM

## 2024-08-13 PROCEDURE — 1036F TOBACCO NON-USER: CPT | Performed by: INTERNAL MEDICINE

## 2024-08-13 PROCEDURE — 99214 OFFICE O/P EST MOD 30 MIN: CPT | Performed by: INTERNAL MEDICINE

## 2024-08-13 RX ORDER — TESTOSTERONE 20.25 MG/1.25G
20.25 GEL TOPICAL DAILY
Qty: 75 G | Refills: 2 | Status: SHIPPED | OUTPATIENT
Start: 2024-08-13

## 2024-08-13 ASSESSMENT — ENCOUNTER SYMPTOMS
CHILLS: 0
PALPITATIONS: 0
FEVER: 0
SHORTNESS OF BREATH: 0
POLYDIPSIA: 0
COUGH: 0

## 2024-08-13 NOTE — PROGRESS NOTES
Subjective   Patient ID: Bakari Delarosa is a 58 y.o. male who presents for Follow-up.    58-year-old male presents today for follow-up details of our assessment today are summarized in previous encounters as well.  History of chronic fatigue symptoms of decreased wellbeing and depression.  Only minor benefits thus far and titrating the sertraline.  Patient has been more stable with his mood but decreased levels of energy fatigue which is chronic and lack of motivation continue to persist.  Has a history of significant testosterone impairment although recent testing has been less impressive for that condition.  I do still believe that this is a large contributor to the patient's symptoms especially with the lack of improvement specifically in energy wellbeing and motivation.  He has had signs and symptoms of hypogonadism and previous testing that reflected on multiple occasions.  I am advising we consider trial of testosterone therapy given we would see a rapid improvement in the symptoms with replacement therapy to an adequate nature.  Patient provided with detailed education about proper technique informed to use the medication proper dosing as well as risks and benefits discussed in detail.  All patient questions were answered.  Patient agreeable to following the recommendation for trial of therapy for up to 3 months to see if there is a significant improvement and response to the symptoms.  Pending that if the fact, additional steps may be taken for complex depression.  Patient will be updated after testing completed in 2 to 4 weeks after starting medication.  Further adjustments based on those results and patient experience.  Next appointment will be determined by results of his testing and patient self-report of response to symptoms.         Review of Systems   Constitutional:  Negative for chills and fever.   Respiratory:  Negative for cough and shortness of breath.    Cardiovascular:  Negative for chest pain  and palpitations.   Endocrine: Negative for polydipsia and polyuria.       Objective   /80   Pulse 102   Temp 36.1 °C (97 °F) (Temporal)   Wt 118 kg (260 lb 8 oz)   BMI 34.37 kg/m²     Physical Exam  Constitutional:       Appearance: Normal appearance.   HENT:      Head: Normocephalic and atraumatic.   Eyes:      Extraocular Movements: Extraocular movements intact.      Pupils: Pupils are equal, round, and reactive to light.   Neck:      Thyroid: No thyroid mass or thyromegaly.      Vascular: No carotid bruit.   Cardiovascular:      Rate and Rhythm: Normal rate and regular rhythm.      Heart sounds: No murmur heard.     No friction rub. No gallop.   Pulmonary:      Effort: No respiratory distress.      Breath sounds: No wheezing, rhonchi or rales.   Musculoskeletal:      Cervical back: Neck supple.      Right lower leg: No edema.      Left lower leg: No edema.   Lymphadenopathy:      Cervical: No cervical adenopathy.   Neurological:      Mental Status: He is alert.         Assessment/Plan   Problem List Items Addressed This Visit             ICD-10-CM    Calculus of kidney N20.0    Relevant Orders    Referral to Urology    Testosterone deficiency - Primary E34.9    Relevant Medications    testosterone 20.25 mg/1.25 gram (1.62 %) gel in metered-dose pump    Other Relevant Orders    Testosterone    CBC    PSA     Other Visit Diagnoses         Codes    Chronic fatigue     R53.82    Relevant Medications    testosterone 20.25 mg/1.25 gram (1.62 %) gel in metered-dose pump    Other Relevant Orders    Testosterone    CBC    PSA

## 2024-08-21 ENCOUNTER — LAB (OUTPATIENT)
Dept: LAB | Facility: LAB | Age: 59
End: 2024-08-21
Payer: COMMERCIAL

## 2024-08-21 ENCOUNTER — APPOINTMENT (OUTPATIENT)
Dept: SLEEP MEDICINE | Facility: CLINIC | Age: 59
End: 2024-08-21
Payer: COMMERCIAL

## 2024-08-21 VITALS
BODY MASS INDEX: 35.52 KG/M2 | HEART RATE: 109 BPM | DIASTOLIC BLOOD PRESSURE: 76 MMHG | HEIGHT: 73 IN | OXYGEN SATURATION: 94 % | SYSTOLIC BLOOD PRESSURE: 118 MMHG | WEIGHT: 268 LBS

## 2024-08-21 DIAGNOSIS — G47.52 REM SLEEP BEHAVIOR DISORDER: ICD-10-CM

## 2024-08-21 DIAGNOSIS — G47.33 OBSTRUCTIVE SLEEP APNEA SYNDROME: ICD-10-CM

## 2024-08-21 DIAGNOSIS — G25.81 RESTLESS LEG SYNDROME: ICD-10-CM

## 2024-08-21 DIAGNOSIS — G47.52 REM SLEEP BEHAVIOR DISORDER: Primary | ICD-10-CM

## 2024-08-21 PROCEDURE — 80307 DRUG TEST PRSMV CHEM ANLYZR: CPT

## 2024-08-21 PROCEDURE — 3008F BODY MASS INDEX DOCD: CPT | Performed by: INTERNAL MEDICINE

## 2024-08-21 PROCEDURE — 99214 OFFICE O/P EST MOD 30 MIN: CPT | Performed by: INTERNAL MEDICINE

## 2024-08-21 PROCEDURE — 1036F TOBACCO NON-USER: CPT | Performed by: INTERNAL MEDICINE

## 2024-08-21 PROCEDURE — G2211 COMPLEX E/M VISIT ADD ON: HCPCS | Performed by: INTERNAL MEDICINE

## 2024-08-21 RX ORDER — CLONAZEPAM 0.5 MG/1
0.5 TABLET ORAL NIGHTLY
Qty: 30 TABLET | Refills: 0 | Status: SHIPPED | OUTPATIENT
Start: 2024-08-21 | End: 2024-09-20

## 2024-08-21 ASSESSMENT — SLEEP AND FATIGUE QUESTIONNAIRES
SITING INACTIVE IN A PUBLIC PLACE LIKE A CLASS ROOM OR A MOVIE THEATER: WOULD NEVER DOZE
HOW LIKELY ARE YOU TO NOD OFF OR FALL ASLEEP WHILE WATCHING TV: HIGH CHANCE OF DOZING
HOW LIKELY ARE YOU TO NOD OFF OR FALL ASLEEP WHEN YOU ARE A PASSENGER IN A CAR FOR AN HOUR WITHOUT A BREAK: WOULD NEVER DOZE
ESS-CHAD TOTAL SCORE: 9
HOW LIKELY ARE YOU TO NOD OFF OR FALL ASLEEP WHILE SITTING AND READING: HIGH CHANCE OF DOZING
HOW LIKELY ARE YOU TO NOD OFF OR FALL ASLEEP WHILE SITTING AND TALKING TO SOMEONE: WOULD NEVER DOZE
HOW LIKELY ARE YOU TO NOD OFF OR FALL ASLEEP WHILE SITTING QUIETLY AFTER LUNCH WITHOUT ALCOHOL: WOULD NEVER DOZE
HOW LIKELY ARE YOU TO NOD OFF OR FALL ASLEEP IN A CAR, WHILE STOPPED FOR A FEW MINUTES IN TRAFFIC: WOULD NEVER DOZE
HOW LIKELY ARE YOU TO NOD OFF OR FALL ASLEEP WHILE LYING DOWN TO REST IN THE AFTERNOON WHEN CIRCUMSTANCES PERMIT: HIGH CHANCE OF DOZING

## 2024-08-21 ASSESSMENT — PAIN SCALES - GENERAL: PAINLEVEL: 0-NO PAIN

## 2024-08-21 NOTE — ASSESSMENT & PLAN NOTE
Reinforced importance of compliance. They were traveling for vacation and he did not bring in the CPAP when they were in the hotel
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
## 2024-08-21 NOTE — ASSESSMENT & PLAN NOTE
Normal ferritin levels. Clonazepam should reduce RLS symptoms. Pramipexole not indicated due to exacerbation of RBD

## 2024-08-21 NOTE — ASSESSMENT & PLAN NOTE
Alprazolam interacts with verapamil, so we will not prescribe it. We will try clonazepam again, educated on the strong likelihood that it will cause increased sedation and that should dissipate as he takes the medication for a longer period of time. Discussed that he can half or even quarter the tablet and gradually titrate to get to a therapeutic dose. Follow-up in 3 months

## 2024-10-01 ENCOUNTER — APPOINTMENT (OUTPATIENT)
Dept: SLEEP MEDICINE | Facility: CLINIC | Age: 59
End: 2024-10-01
Payer: COMMERCIAL

## 2024-10-14 ENCOUNTER — APPOINTMENT (OUTPATIENT)
Dept: SLEEP MEDICINE | Facility: CLINIC | Age: 59
End: 2024-10-14
Payer: COMMERCIAL

## 2024-10-14 VITALS
WEIGHT: 268 LBS | OXYGEN SATURATION: 98 % | HEIGHT: 73 IN | DIASTOLIC BLOOD PRESSURE: 76 MMHG | HEART RATE: 74 BPM | BODY MASS INDEX: 35.52 KG/M2 | SYSTOLIC BLOOD PRESSURE: 124 MMHG

## 2024-10-14 DIAGNOSIS — G47.33 OBSTRUCTIVE SLEEP APNEA SYNDROME: Primary | ICD-10-CM

## 2024-10-14 DIAGNOSIS — G47.19 EXCESSIVE DAYTIME SLEEPINESS: ICD-10-CM

## 2024-10-14 DIAGNOSIS — G47.52 REM SLEEP BEHAVIOR DISORDER: ICD-10-CM

## 2024-10-14 PROCEDURE — 1036F TOBACCO NON-USER: CPT | Performed by: INTERNAL MEDICINE

## 2024-10-14 PROCEDURE — 99214 OFFICE O/P EST MOD 30 MIN: CPT | Performed by: INTERNAL MEDICINE

## 2024-10-14 PROCEDURE — 3008F BODY MASS INDEX DOCD: CPT | Performed by: INTERNAL MEDICINE

## 2024-10-14 RX ORDER — CLONAZEPAM 1 MG/1
1 TABLET ORAL NIGHTLY
Qty: 30 TABLET | Refills: 1 | Status: SHIPPED | OUTPATIENT
Start: 2024-10-14 | End: 2024-12-13

## 2024-10-14 RX ORDER — SOLRIAMFETOL 150 MG/1
150 TABLET, FILM COATED ORAL DAILY
Qty: 30 TABLET | Refills: 2 | Status: SHIPPED | OUTPATIENT
Start: 2024-10-14 | End: 2025-01-12

## 2024-10-14 ASSESSMENT — SLEEP AND FATIGUE QUESTIONNAIRES
HOW LIKELY ARE YOU TO NOD OFF OR FALL ASLEEP WHILE SITTING AND READING: HIGH CHANCE OF DOZING
HOW LIKELY ARE YOU TO NOD OFF OR FALL ASLEEP WHILE SITTING QUIETLY AFTER LUNCH WITHOUT ALCOHOL: WOULD NEVER DOZE
ESS-CHAD TOTAL SCORE: 8
HOW LIKELY ARE YOU TO NOD OFF OR FALL ASLEEP IN A CAR, WHILE STOPPED FOR A FEW MINUTES IN TRAFFIC: WOULD NEVER DOZE
SITING INACTIVE IN A PUBLIC PLACE LIKE A CLASS ROOM OR A MOVIE THEATER: WOULD NEVER DOZE
HOW LIKELY ARE YOU TO NOD OFF OR FALL ASLEEP WHILE SITTING AND TALKING TO SOMEONE: WOULD NEVER DOZE
HOW LIKELY ARE YOU TO NOD OFF OR FALL ASLEEP WHEN YOU ARE A PASSENGER IN A CAR FOR AN HOUR WITHOUT A BREAK: WOULD NEVER DOZE
HOW LIKELY ARE YOU TO NOD OFF OR FALL ASLEEP WHILE WATCHING TV: MODERATE CHANCE OF DOZING
HOW LIKELY ARE YOU TO NOD OFF OR FALL ASLEEP WHILE LYING DOWN TO REST IN THE AFTERNOON WHEN CIRCUMSTANCES PERMIT: HIGH CHANCE OF DOZING

## 2024-10-14 ASSESSMENT — PAIN SCALES - GENERAL: PAINLEVEL: 0-NO PAIN
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
## 2024-10-14 NOTE — ASSESSMENT & PLAN NOTE
Requires prolonged sleep time.  There is no improvement in symptoms we will discuss consideration of MR brain, due to RBD symptoms and excessive time sleepiness

## 2024-10-21 NOTE — PROGRESS NOTES
HPI   57 yo of [facility] with dyslipidemia, gout, kerri, hx of kidney stones, s/p parathyroid surgery for 1 HPT in his early 50's.     -hx of multiple renal stones in his 30's, hx lithotripsy, hx of retrival  -has had renal stones as recent Dec 2022, follows Dr. Quintana yearly  -stones are calcium oxalate stones  -pt takes chlorthalidone 25mg and kcl 20meq daily for elevated urinary calcium and renal stone prevention (this has helped)     Labs: oct 2023-cr0.9, k-3.3, ca-9.4  April 2023 litholink 24hr urine: urinary calcium-262, volume 4.14 Liters, all other factors showed decreased risk of renal stone except for citrate 348 mg/day (mid range value)    Oct 2024 litholink: urinary calcium-320mg, oxalate-50mg, volume-3.02 L/day, urine ph elevated  -recurrent kidney stones in 2024      Current Outpatient Medications:     allopurinol (Zyloprim) 100 mg tablet, Take 1 tablet (100 mg) by mouth once daily., Disp: 90 tablet, Rfl: 3    allopurinol (Zyloprim) 300 mg tablet, Take 1 tablet (300 mg) by mouth once daily., Disp: 90 tablet, Rfl: 3    atorvastatin (Lipitor) 40 mg tablet, Take 1 tablet (40 mg) by mouth once daily., Disp: 90 tablet, Rfl: 3    chlorthalidone (Hygroton) 25 mg tablet, Take 1 tablet (25 mg) by mouth once daily., Disp: 90 tablet, Rfl: 3    cholecalciferol (Vitamin D-3) 25 MCG (1000 UT) tablet, Take by mouth., Disp: , Rfl:     clonazePAM (KlonoPIN) 1 mg tablet, Take 1 tablet (1 mg) by mouth once daily at bedtime., Disp: 30 tablet, Rfl: 1    magnesium chloride (MagDelay) 64 mg EC tablet, Take 1 tablet (64 mg) by mouth once daily., Disp: 90 tablet, Rfl: 3    potassium chloride CR (Klor-Con M20) 20 mEq ER tablet, Take 1 tablet (20 mEq) by mouth once daily. Do not crush or chew., Disp: 90 tablet, Rfl: 3    sertraline (Zoloft) 100 mg tablet, Take 2 tablets (200 mg) by mouth once daily., Disp: 180 tablet, Rfl: 3    sildenafil (Viagra) 100 mg tablet, Take 1 tablet (100 mg) by mouth once daily as needed for  "erectile dysfunction., Disp: , Rfl:     SUMAtriptan (Imitrex) 100 mg tablet, Take 1 tablet (100 mg) by mouth 1 time if needed for migraine for up to 108 doses., Disp: 9 tablet, Rfl: 11    ubrogepant (Ubrelvy) 100 mg tablet tablet, Take 1 tablet (100 mg) by mouth if needed (first sign of migraine)., Disp: 12 tablet, Rfl: 5    verapamil ER (Veralan PM) 240 mg 24 hr capsule, Take 1 capsule (240 mg) by mouth once daily at bedtime. Do not crush or chew., Disp: 90 capsule, Rfl: 3    zinc gluconate 50 mg tablet, Take by mouth once daily., Disp: , Rfl:     solriamfetoL (Sunosi) 150 mg tablet, Take 150 mg by mouth once daily. (Patient not taking: Reported on 10/22/2024), Disp: 30 tablet, Rfl: 2    testosterone 20.25 mg/1.25 gram (1.62 %) gel in metered-dose pump, Place 20.25 mg on the skin once daily. (Patient not taking: Reported on 10/22/2024), Disp: 75 g, Rfl: 2      Allergies as of 10/22/2024 - Reviewed 10/22/2024   Allergen Reaction Noted    Bupropion Blurred vision and Other 01/12/2021    Pramipexole Other 08/21/2024    Ropinirole Dizziness 08/22/2023    Bupropion hcl Other 01/12/2021    Respirol Unknown 08/22/2023    Ropinirole hcl Dizziness 08/22/2023         Review of Systems   Cardiology: Lightheadedness-denies.  Chest pain-denies.  Leg edema-denies.  Palpitations-denies.  Respiratory: Cough-denies. Shortness of breath-denies.  Wheezing-denies.  Gastroenterology: Constipation-denies.  Diarrhea-denies.  Heartburn-denies.  Endocrinology: Cold intolerance-denies.  Heat intolerance-denies.  Sweats-denies.  Neurology: Headache-denies.  Tremor-denies.  Neuropathy in extremities-denies.  Psychology: Low energy-denies.  Irritability-denies.  Sleep disturbances-denies.      /70   Pulse 94   Ht 1.854 m (6' 1\")   Wt 124 kg (274 lb)   BMI 36.15 kg/m²       Labs:  Lab Results   Component Value Date    WBC 9.0 12/28/2023    NRBC 0.0 12/28/2023    RBC 4.96 12/28/2023    HGB 16.1 12/28/2023    HCT 47.2 12/28/2023    PLT " "230 12/28/2023     Lab Results   Component Value Date    CALCIUM 9.4 10/03/2023    AST 23 10/03/2023    ALKPHOS 99 10/03/2023    BILITOT 0.8 10/03/2023    PROT 7.2 10/03/2023    ALBUMIN 4.7 10/03/2023    GLOB 2.8 06/13/2022    AGR 1.6 06/13/2022     10/03/2023    K 3.3 (L) 10/03/2023     10/03/2023    CO2 25 10/03/2023    ANIONGAP 15 10/03/2023    BUN 13 10/03/2023    CREATININE 0.90 10/03/2023    UREACREAUR 18.9 06/13/2022    GLUCOSE 90 10/03/2023    ALT 31 10/03/2023    EGFR >90 10/03/2023     Lab Results   Component Value Date    CHOL 151 10/03/2023    TRIG 190 (H) 10/03/2023    HDL 34.0 (L) 10/03/2023    LDLCALC 79 10/03/2023     No results found for: \"MICROALBCREA\"  Lab Results   Component Value Date    TSH 1.86 12/28/2023     No results found for: \"OILZUHNU64\"  Lab Results   Component Value Date    HGBA1C 5.3 10/03/2023         Physical Exam   General Appearance: pleasant, cooperative, no acute distress  HEENT: no chemosis, no proptosis, no lid lag, no lid retraction  Neck: no lymphadenopathy, no thyromegaly, no dominant thyroid nodules  Heart: no murmurs, regular rate and rhythm, S1 and S2  Lungs: no wheezes, no rhonci, no rales  Extremities: no lower extremity swelling      Assessment/Plan   1. Calculus of kidney (Primary)  -continue chlorthalindone 25mg/Kcl 20 meq  -aggressive hydration  -add caltrate 600mg bid (ca/oxalate elevated, take calcium supplement to bind oxalate and lower urinary ca/oxalate saturation      Follow Up:  Tip 1 year    -labs/tests/notes reviewed  -reviewed and counseled patient on medication monitoring and side effects          "

## 2024-10-22 ENCOUNTER — APPOINTMENT (OUTPATIENT)
Dept: SLEEP MEDICINE | Facility: CLINIC | Age: 59
End: 2024-10-22
Payer: COMMERCIAL

## 2024-10-22 ENCOUNTER — APPOINTMENT (OUTPATIENT)
Dept: ENDOCRINOLOGY | Facility: CLINIC | Age: 59
End: 2024-10-22
Payer: COMMERCIAL

## 2024-10-22 VITALS
SYSTOLIC BLOOD PRESSURE: 119 MMHG | HEART RATE: 94 BPM | HEIGHT: 73 IN | BODY MASS INDEX: 36.31 KG/M2 | DIASTOLIC BLOOD PRESSURE: 70 MMHG | WEIGHT: 274 LBS

## 2024-10-22 DIAGNOSIS — N20.0 CALCULUS OF KIDNEY: Primary | ICD-10-CM

## 2024-10-22 PROCEDURE — 3008F BODY MASS INDEX DOCD: CPT | Performed by: INTERNAL MEDICINE

## 2024-10-22 PROCEDURE — 1036F TOBACCO NON-USER: CPT | Performed by: INTERNAL MEDICINE

## 2024-10-22 PROCEDURE — 99213 OFFICE O/P EST LOW 20 MIN: CPT | Performed by: INTERNAL MEDICINE

## 2024-10-22 RX ORDER — CHLORTHALIDONE 25 MG/1
25 TABLET ORAL DAILY
Qty: 90 TABLET | Refills: 3 | Status: SHIPPED | OUTPATIENT
Start: 2024-10-22 | End: 2025-10-22

## 2024-10-22 RX ORDER — POTASSIUM CHLORIDE 20 MEQ/1
20 TABLET, EXTENDED RELEASE ORAL DAILY
Qty: 90 TABLET | Refills: 3 | Status: SHIPPED | OUTPATIENT
Start: 2024-10-22 | End: 2025-10-22

## 2024-10-22 ASSESSMENT — ENCOUNTER SYMPTOMS
LOSS OF SENSATION IN FEET: 0
DEPRESSION: 0
OCCASIONAL FEELINGS OF UNSTEADINESS: 0

## 2024-10-22 ASSESSMENT — PATIENT HEALTH QUESTIONNAIRE - PHQ9
2. FEELING DOWN, DEPRESSED OR HOPELESS: NOT AT ALL
SUM OF ALL RESPONSES TO PHQ9 QUESTIONS 1 & 2: 0
1. LITTLE INTEREST OR PLEASURE IN DOING THINGS: NOT AT ALL

## 2024-10-22 ASSESSMENT — LIFESTYLE VARIABLES
AUDIT-C TOTAL SCORE: 3
HOW OFTEN DO YOU HAVE SIX OR MORE DRINKS ON ONE OCCASION: NEVER
SKIP TO QUESTIONS 9-10: 1
HOW OFTEN DO YOU HAVE A DRINK CONTAINING ALCOHOL: 2-3 TIMES A WEEK
HOW MANY STANDARD DRINKS CONTAINING ALCOHOL DO YOU HAVE ON A TYPICAL DAY: 1 OR 2

## 2024-10-22 ASSESSMENT — PAIN SCALES - GENERAL: PAINLEVEL_OUTOF10: 0-NO PAIN

## 2024-10-23 ENCOUNTER — TELEPHONE (OUTPATIENT)
Dept: SLEEP MEDICINE | Facility: HOSPITAL | Age: 59
End: 2024-10-23
Payer: COMMERCIAL

## 2024-10-25 ENCOUNTER — LAB (OUTPATIENT)
Dept: LAB | Facility: LAB | Age: 59
End: 2024-10-25
Payer: COMMERCIAL

## 2024-10-25 DIAGNOSIS — N20.0 CALCULUS OF KIDNEY: ICD-10-CM

## 2024-10-25 DIAGNOSIS — E34.9 TESTOSTERONE DEFICIENCY: ICD-10-CM

## 2024-10-25 DIAGNOSIS — R53.82 CHRONIC FATIGUE: ICD-10-CM

## 2024-10-25 LAB
ANION GAP SERPL CALCULATED.3IONS-SCNC: 11 MMOL/L (ref 10–20)
BUN SERPL-MCNC: 18 MG/DL (ref 6–23)
CALCIUM SERPL-MCNC: 9.5 MG/DL (ref 8.6–10.3)
CHLORIDE SERPL-SCNC: 106 MMOL/L (ref 98–107)
CO2 SERPL-SCNC: 26 MMOL/L (ref 21–32)
CREAT SERPL-MCNC: 0.9 MG/DL (ref 0.5–1.3)
EGFRCR SERPLBLD CKD-EPI 2021: >90 ML/MIN/1.73M*2
ERYTHROCYTE [DISTWIDTH] IN BLOOD BY AUTOMATED COUNT: 12.5 % (ref 11.5–14.5)
GLUCOSE SERPL-MCNC: 88 MG/DL (ref 74–99)
HCT VFR BLD AUTO: 47.8 % (ref 41–52)
HGB BLD-MCNC: 16.8 G/DL (ref 13.5–17.5)
MCH RBC QN AUTO: 33 PG (ref 26–34)
MCHC RBC AUTO-ENTMCNC: 35.1 G/DL (ref 32–36)
MCV RBC AUTO: 94 FL (ref 80–100)
NRBC BLD-RTO: 0 /100 WBCS (ref 0–0)
PLATELET # BLD AUTO: 222 X10*3/UL (ref 150–450)
POTASSIUM SERPL-SCNC: 4 MMOL/L (ref 3.5–5.3)
RBC # BLD AUTO: 5.09 X10*6/UL (ref 4.5–5.9)
SODIUM SERPL-SCNC: 139 MMOL/L (ref 136–145)
WBC # BLD AUTO: 8.5 X10*3/UL (ref 4.4–11.3)

## 2024-10-25 PROCEDURE — 85027 COMPLETE CBC AUTOMATED: CPT

## 2024-10-25 PROCEDURE — 84403 ASSAY OF TOTAL TESTOSTERONE: CPT

## 2024-10-25 PROCEDURE — 84153 ASSAY OF PSA TOTAL: CPT

## 2024-10-25 PROCEDURE — 36415 COLL VENOUS BLD VENIPUNCTURE: CPT

## 2024-10-25 PROCEDURE — 80048 BASIC METABOLIC PNL TOTAL CA: CPT

## 2024-10-26 LAB
PSA SERPL-MCNC: 1.06 NG/ML
TESTOST SERPL-MCNC: 383 NG/DL (ref 240–1000)

## 2024-11-05 DIAGNOSIS — G47.33 OBSTRUCTIVE SLEEP APNEA SYNDROME: ICD-10-CM

## 2024-11-05 RX ORDER — SOLRIAMFETOL 150 MG/1
150 TABLET, FILM COATED ORAL DAILY
Qty: 30 TABLET | Refills: 2 | Status: SHIPPED | OUTPATIENT
Start: 2024-11-05 | End: 2025-02-03

## 2024-11-05 NOTE — PROGRESS NOTES
Pt states [facility] is not able to fill his sunosi, requests pharmacy change to preferred [facility]     Next appt with sleep med provider on 12/16/2024

## 2024-11-18 ENCOUNTER — APPOINTMENT (OUTPATIENT)
Dept: SLEEP MEDICINE | Facility: CLINIC | Age: 59
End: 2024-11-18
Payer: COMMERCIAL

## 2024-11-26 ENCOUNTER — APPOINTMENT (OUTPATIENT)
Dept: PRIMARY CARE | Facility: CLINIC | Age: 59
End: 2024-11-26
Payer: COMMERCIAL

## 2024-11-26 VITALS
HEART RATE: 123 BPM | BODY MASS INDEX: 34.83 KG/M2 | WEIGHT: 264 LBS | DIASTOLIC BLOOD PRESSURE: 78 MMHG | TEMPERATURE: 98 F | SYSTOLIC BLOOD PRESSURE: 115 MMHG

## 2024-11-26 DIAGNOSIS — H92.09 EAR ACHE: ICD-10-CM

## 2024-11-26 DIAGNOSIS — H93.11 TINNITUS AURIUM, RIGHT: ICD-10-CM

## 2024-11-26 DIAGNOSIS — F33.41 RECURRENT MAJOR DEPRESSIVE DISORDER, IN PARTIAL REMISSION (CMS-HCC): Primary | ICD-10-CM

## 2024-11-26 PROCEDURE — 99213 OFFICE O/P EST LOW 20 MIN: CPT | Performed by: INTERNAL MEDICINE

## 2024-11-26 PROCEDURE — 1036F TOBACCO NON-USER: CPT | Performed by: INTERNAL MEDICINE

## 2024-11-26 RX ORDER — FLUTICASONE PROPIONATE 50 MCG
1 SPRAY, SUSPENSION (ML) NASAL DAILY
Qty: 16 G | Refills: 0 | Status: SHIPPED | OUTPATIENT
Start: 2024-11-26 | End: 2025-11-26

## 2024-11-26 RX ORDER — AMOXICILLIN AND CLAVULANATE POTASSIUM 875; 125 MG/1; MG/1
875 TABLET, FILM COATED ORAL 2 TIMES DAILY
Qty: 14 TABLET | Refills: 0 | Status: SHIPPED | OUTPATIENT
Start: 2024-11-26 | End: 2024-12-03

## 2024-11-26 ASSESSMENT — PATIENT HEALTH QUESTIONNAIRE - PHQ9
2. FEELING DOWN, DEPRESSED OR HOPELESS: NOT AT ALL
9. THOUGHTS THAT YOU WOULD BE BETTER OFF DEAD, OR OF HURTING YOURSELF: NOT AT ALL
SUM OF ALL RESPONSES TO PHQ QUESTIONS 1-9: 11
5. POOR APPETITE OR OVEREATING: MORE THAN HALF THE DAYS
8. MOVING OR SPEAKING SO SLOWLY THAT OTHER PEOPLE COULD HAVE NOTICED. OR THE OPPOSITE, BEING SO FIGETY OR RESTLESS THAT YOU HAVE BEEN MOVING AROUND A LOT MORE THAN USUAL: SEVERAL DAYS
SUM OF ALL RESPONSES TO PHQ9 QUESTIONS 1 AND 2: 3
3. TROUBLE FALLING OR STAYING ASLEEP OR SLEEPING TOO MUCH: MORE THAN HALF THE DAYS
6. FEELING BAD ABOUT YOURSELF - OR THAT YOU ARE A FAILURE OR HAVE LET YOURSELF OR YOUR FAMILY DOWN: NOT AT ALL
7. TROUBLE CONCENTRATING ON THINGS, SUCH AS READING THE NEWSPAPER OR WATCHING TELEVISION: NOT AT ALL
4. FEELING TIRED OR HAVING LITTLE ENERGY: NEARLY EVERY DAY
1. LITTLE INTEREST OR PLEASURE IN DOING THINGS: NEARLY EVERY DAY

## 2024-11-26 ASSESSMENT — ENCOUNTER SYMPTOMS
FEVER: 0
SHORTNESS OF BREATH: 0
CHILLS: 0
PALPITATIONS: 0
COUGH: 0
POLYDIPSIA: 0

## 2024-11-26 ASSESSMENT — PAIN SCALES - GENERAL: PAINLEVEL_OUTOF10: 7

## 2024-11-26 NOTE — PROGRESS NOTES
Subjective   Patient ID: Bakari Delarosa is a 58 y.o. male who presents for No chief complaint on file..    58-year-old male presents today for routine follow-up on major depressive disorder presenting with lack of motivation chronic fatigue overeating historically with depressed mood although those symptoms have improved.  He has been tolerant of the high dose Zoloft very well better than his previous use of Prozac which had a lot of side effects.  Despite the use of that medication and additional options have not been tolerated well with side effects they are listed on his allergy list.  The patient chooses and wishes to preferably keep his medications to the most limited number possible for long-term care.  We are looking for ways to further improve upon his care for the major depressive disorder specifically the chronic fatigue and improve motivation.  I was considering changing the patient to an alternative medication from an SNRI class versus combination therapy.  I think given the complex nature multiple failed prior medications I would feel most comfortable if the patient had at least a one-time consultation with our behavioral health specialty team to review his medication list is history of disease and make recommendations about the most appropriate and effective course moving forward for further adjustments.  I have referred him to our access behavioral health clinic for their insight and I appreciate their input regarding his case.           Review of Systems   Constitutional:  Negative for chills and fever.   Respiratory:  Negative for cough and shortness of breath.    Cardiovascular:  Negative for chest pain and palpitations.   Endocrine: Negative for polydipsia and polyuria.       Objective   /78 (BP Location: Left arm, Patient Position: Sitting, BP Cuff Size: Adult)   Pulse (!) 123   Temp 36.7 °C (98 °F) (Temporal)   Wt 120 kg (264 lb)   BMI 34.83 kg/m²     Physical Exam  HENT:      Head:  Normocephalic and atraumatic.      Right Ear: Tympanic membrane and ear canal normal. There is no impacted cerumen.      Left Ear: Tympanic membrane and ear canal normal. There is no impacted cerumen.      Ears:      Comments: Clear/pearly L TM. R TM white/hazy.      Nose: Nose normal.      Mouth/Throat:      Mouth: Mucous membranes are moist.      Pharynx: Oropharynx is clear. No oropharyngeal exudate or posterior oropharyngeal erythema.   Eyes:      Extraocular Movements: Extraocular movements intact.      Pupils: Pupils are equal, round, and reactive to light.   Cardiovascular:      Rate and Rhythm: Normal rate.   Pulmonary:      Effort: No respiratory distress.      Breath sounds: No wheezing, rhonchi or rales.   Musculoskeletal:      Cervical back: Neck supple. No tenderness.   Lymphadenopathy:      Cervical: No cervical adenopathy.         Assessment/Plan   Assessment & Plan  Recurrent major depressive disorder, in partial remission (CMS-HCC)    Orders:    [facility]erral to [facility]; Future    Tinnitus aurium, right    Orders:    amoxicillin-pot clavulanate (Augmentin) 875-125 mg tablet; Take 1 tablet (875 mg) by mouth 2 times a day for 7 days.    fluticasone (Flonase) 50 mcg/actuation nasal spray; Administer 1 spray into each nostril once daily. Shake gently. Before first use, prime pump. After use, clean tip and replace cap.    Ear ache    Orders:    amoxicillin-pot clavulanate (Augmentin) 875-125 mg tablet; Take 1 tablet (875 mg) by mouth 2 times a day for 7 days.    fluticasone (Flonase) 50 mcg/actuation nasal spray; Administer 1 spray into each nostril once daily. Shake gently. Before first use, prime pump. After use, clean tip and replace cap.

## 2024-11-29 DIAGNOSIS — G43.009 MIGRAINE WITHOUT AURA AND WITHOUT STATUS MIGRAINOSUS, NOT INTRACTABLE: ICD-10-CM

## 2024-12-03 RX ORDER — SUMATRIPTAN SUCCINATE 100 MG/1
TABLET ORAL
Qty: 9 TABLET | Refills: 27 | Status: SHIPPED | OUTPATIENT
Start: 2024-12-03

## 2024-12-10 ENCOUNTER — APPOINTMENT (OUTPATIENT)
Dept: OPHTHALMOLOGY | Facility: CLINIC | Age: 59
End: 2024-12-10
Payer: COMMERCIAL

## 2024-12-13 DIAGNOSIS — E79.0 HYPERURICEMIA: ICD-10-CM

## 2024-12-13 RX ORDER — ALLOPURINOL 300 MG/1
300 TABLET ORAL DAILY
Qty: 90 TABLET | Refills: 3 | Status: SHIPPED | OUTPATIENT
Start: 2024-12-13

## 2024-12-16 ENCOUNTER — APPOINTMENT (OUTPATIENT)
Dept: SLEEP MEDICINE | Facility: CLINIC | Age: 59
End: 2024-12-16
Payer: COMMERCIAL

## 2024-12-16 VITALS
BODY MASS INDEX: 35.12 KG/M2 | HEART RATE: 84 BPM | WEIGHT: 265 LBS | HEIGHT: 73 IN | DIASTOLIC BLOOD PRESSURE: 86 MMHG | SYSTOLIC BLOOD PRESSURE: 132 MMHG | OXYGEN SATURATION: 98 %

## 2024-12-16 DIAGNOSIS — F51.12 BEHAVIORALLY INDUCED INSUFFICIENT SLEEP SYNDROME: ICD-10-CM

## 2024-12-16 DIAGNOSIS — G47.61 PERIODIC LIMB MOVEMENT DISORDER (PLMD): ICD-10-CM

## 2024-12-16 DIAGNOSIS — G47.52 REM SLEEP BEHAVIOR DISORDER: ICD-10-CM

## 2024-12-16 DIAGNOSIS — G47.33 OBSTRUCTIVE SLEEP APNEA SYNDROME: Primary | ICD-10-CM

## 2024-12-16 DIAGNOSIS — G47.10 HYPERSOMNOLENCE: ICD-10-CM

## 2024-12-16 PROCEDURE — 3008F BODY MASS INDEX DOCD: CPT | Performed by: INTERNAL MEDICINE

## 2024-12-16 PROCEDURE — 1036F TOBACCO NON-USER: CPT | Performed by: INTERNAL MEDICINE

## 2024-12-16 PROCEDURE — 99214 OFFICE O/P EST MOD 30 MIN: CPT | Performed by: INTERNAL MEDICINE

## 2024-12-16 RX ORDER — CLONAZEPAM 1 MG/1
1.5 TABLET ORAL NIGHTLY
Qty: 135 TABLET | Refills: 0 | Status: SHIPPED | OUTPATIENT
Start: 2024-12-16 | End: 2025-03-16

## 2024-12-16 ASSESSMENT — SLEEP AND FATIGUE QUESTIONNAIRES
HOW LIKELY ARE YOU TO NOD OFF OR FALL ASLEEP IN A CAR, WHILE STOPPED FOR A FEW MINUTES IN TRAFFIC: WOULD NEVER DOZE
HOW LIKELY ARE YOU TO NOD OFF OR FALL ASLEEP WHILE WATCHING TV: MODERATE CHANCE OF DOZING
SITING INACTIVE IN A PUBLIC PLACE LIKE A CLASS ROOM OR A MOVIE THEATER: WOULD NEVER DOZE
HOW LIKELY ARE YOU TO NOD OFF OR FALL ASLEEP WHILE SITTING AND READING: HIGH CHANCE OF DOZING
ESS-CHAD TOTAL SCORE: 8
HOW LIKELY ARE YOU TO NOD OFF OR FALL ASLEEP WHILE SITTING QUIETLY AFTER LUNCH WITHOUT ALCOHOL: WOULD NEVER DOZE
HOW LIKELY ARE YOU TO NOD OFF OR FALL ASLEEP WHEN YOU ARE A PASSENGER IN A CAR FOR AN HOUR WITHOUT A BREAK: WOULD NEVER DOZE
HOW LIKELY ARE YOU TO NOD OFF OR FALL ASLEEP WHILE LYING DOWN TO REST IN THE AFTERNOON WHEN CIRCUMSTANCES PERMIT: HIGH CHANCE OF DOZING
HOW LIKELY ARE YOU TO NOD OFF OR FALL ASLEEP WHILE SITTING AND TALKING TO SOMEONE: WOULD NEVER DOZE

## 2024-12-16 ASSESSMENT — PAIN SCALES - GENERAL: PAINLEVEL_OUTOF10: 0-NO PAIN

## 2024-12-16 NOTE — PROGRESS NOTES
Patient: Bakari Delarosa    [id number]  : 1965 -- AGE 58 y.o.    Provider: Ruperto Orellana MD     Location [facility]   Service Date: 2024              [facility]  Followup Visit Note      HISTORY OF PRESENT ILLNESS     The patient's referring provider is: No ref. provider found    HISTORY OF PRESENT ILLNESS   Bakari Delarosa is a 58 y.o. male who presents to a [facility] for followup.     The patient  has a past medical history of Anxiety, Depression, Headache, Personal history of other diseases of the digestive system, Personal history of other diseases of the nervous system and sense organs, Personal history of other endocrine, nutritional and metabolic disease, Restless leg syndrome, Sleep apnea, obstructive, Sleep walking disorder, and Snoring..    PAST SLEEP HISTORY  PSG on CPAP on 3/21/2024.  Started 11 cm H2O and increased to 13 cm H2O.  He had severe sleep fragmentation.  His REM was delayed to the early morning.  PLMI 52.9 with 32% associated with arousals.  PLM AI 16.9.  He did exhibit REM sleep without atonia.  There were not any vocalizations or abnormal motor behaviors during this time     MSLT 3/22/2024: Mean sleep latency 16.8 with no sleep onset REM periods.  Sleep log determined average sleep is 6 to 8 hours with napping behavior     2024: Office Visit: Dr. Ruperto Orellana: Pramipexole exacerbated RBD symptoms. Sent Ambien, had no effect on RBD symptoms     He does endorse an urge to move his legs around, typically in the evening or bed. Felt like an uncontrolled itch. Currently not any better. Continues to act of dreams and accidentally struck bed partner.  Prescribed clonazepam 0.5 mg.  Alprazolam interacts with his verapamil.  RLS and PLMD.  Should get some improvement with clonazepam dual effect   10/14/2024: Office Visit: Dr. Ruperto Orellana: Bakari presents for medication management  for his RLS managed with 0.5 mg clonazepam.  Bed partner reports that she has not noticed any reduction in his nocturnal activity with movements of arms and legs despite being on clonazepam 0.5 mg.  She reports that he is sleeping more.  She reports that he sleeps over 12 hours on the weekends.  Typical bedtime is 8 PM, typical wake time 5 AM on workdays     Typical bedtime is 8 PM, typical wake time is 8 AM on nonworking days and will often need to take a nap midday.  He previously tried modafinil was unsuccessful in managing his daytime sleepiness symptoms    CURRENT HISTORY    On today's visit, the patient reports Rem Sleep Behavior Disorder, Sleep Apnea, and Pap Adherence Followup     He has RBD which we are trying to manage with clonazepam.  RLS also utilizing clonazepam.  For his excessive time sleepiness with RIMA he is on Sunosi.  Per patient his spouse reports that she has not noticed a difference  He believes that he is more awake during the nap  He will nap on his days off    Sleep Schedule: On work days He goes to bed at 8 PM and his sleep latency is 30 minutes. He wakes by  4-4:30 AM. He has 5 awakenings per night that last for few minutes. He gets about 5 hours of sleep per night.   Non working days: He goes to bed at 8 PM and his sleep latency is 30 minutes. He wakes by  5 AM. He has 5 awakenings per night that last for few minutes. He gets about 5-6 hours of sleep per night.      PAP Adherence:  DURABLE MEDICAL EQUIPMENT COMPANY: [facility]  Machine: THERAPY: [employer] AUTO-PAP PRESSURE SETTINGS: 4 cm H2O - 16 cm H2O   Issues with therapy: none      Daytime Symptoms    Patient report some daytime symptoms including:   Patient denies daytime symptoms including: Denies: excessive daytime sleepiness napping on weekends    ESS: 8       REVIEW OF SYSTEMS     REVIEW OF SYSTEMS  Review of Systems      ALLERGIES AND MEDICATIONS     ALLERGIES  Allergies   Allergen Reactions    Bupropion  Blurred vision and Other    Pramipexole Other     Exacerbation of RBD    Ropinirole Dizziness    Bupropion Hcl Other    Respirol Unknown    Ropinirole Hcl Dizziness       MEDICATIONS: He has a current medication list which includes the following prescription(s): allopurinol - Take 1 tablet (100 mg) by mouth once daily, allopurinol - TAKE 1 TABLET DAILY, atorvastatin - Take 1 tablet (40 mg) by mouth once daily, chlorthalidone - Take 1 tablet (25 mg) by mouth once daily, cholecalciferol - Take by mouth, fluticasone - Administer 1 spray into each nostril once daily. Shake gently. Before first use, prime pump. After use, clean tip and replace cap, magnesium chloride - Take 1 tablet (64 mg) by mouth once daily, potassium chloride cr - Take 1 tablet (20 mEq) by mouth once daily. Do not crush or chew, sertraline - Take 2 tablets (200 mg) by mouth once daily, sildenafil - Take 1 tablet (100 mg) by mouth once daily as needed for erectile dysfunction, sunosi - Take 150 mg by mouth once daily, sumatriptan - TAKE 1 TABLET ONE TIME IF NEEDED FOR MIGRAINE FOR UP  DOSES, ubrogepant - Take 1 tablet (100 mg) by mouth if needed (first sign of migraine), verapamil er - Take 1 capsule (240 mg) by mouth once daily at bedtime. Do not crush or chew, zinc gluconate - Take by mouth once daily, and clonazepam - Take 1.5 tablets (1.5 mg) by mouth once daily at bedtime.    PAST MEDICAL HISTORY : He  has a past medical history of Anxiety, Depression, Headache, Personal history of other diseases of the digestive system, Personal history of other diseases of the nervous system and sense organs, Personal history of other endocrine, nutritional and metabolic disease, Restless leg syndrome, Sleep apnea, obstructive, Sleep walking disorder, and Snoring.    PAST SURGICAL HISTORY: He  has a past surgical history that includes Other surgical history (01/04/2022); Other surgical history (01/04/2022); Back surgery; Circumcision, primary; and [place]  "tooth extraction.     FAMILY HISTORY: No changes since previous visit. Otherwise non-contributory as charted.       SOCIAL HISTORY  He  reports that he quit smoking about 11 years ago. His smoking use included cigarettes. He started smoking about 44 years ago. He has a 33 pack-year smoking history. He quit smokeless tobacco use about 26 years ago. He reports current alcohol use of about 5.0 standard drinks of alcohol per week. He reports that he does not use drugs.       PHYSICAL EXAM     VITAL SIGNS: /86   Pulse 84   Ht 1.854 m (6' 1\")   Wt 120 kg (265 lb)   SpO2 98%   BMI 34.96 kg/m²      CURRENT WEIGHT: [unfilled]  BMI: [unfilled]  PREVIOUS WEIGHTS:  Wt Readings from Last 3 Encounters:   12/16/24 120 kg (265 lb)   11/26/24 120 kg (264 lb)   10/22/24 124 kg (274 lb)       Physical Exam  PHYSICAL EXAM: GENERAL: alert pleasant and cooperative no acute distress  PSYCH EXAM: alert,oriented, in NAD with a full range of affect, normal behavior and no psychotic features      RESULTS/DATA     Iron (ug/dL)   Date Value   10/03/2023 157   07/12/2023 177 (H)     % Saturation (%)   Date Value   10/03/2023 41     Iron Saturation (%)   Date Value   07/12/2023 40     TIBC (ug/dL)   Date Value   10/03/2023 380   07/12/2023 439     Ferritin (ug/L)   Date Value   07/12/2023 137       PAP Adherence  A PAP adherence download was obtained and data was reviewed personally today in clinic. (see scanned document in EPIC)  COMPLIANCE REPORT RESULTS: Date Range:  11/16/2024 - 12/15/2024 Days used: 29 > 4 hours usage:  70 % Average usage hours on dates used: 5.5 hours Residual AHI 1.2 on AutoPap 4 to 16 cm H2O      ASSESSMENT/PLAN     Mr. Delarosa is a 58 y.o. male and  has a past medical history of Anxiety, Depression, Headache, Personal history of other diseases of the digestive system, Personal history of other diseases of the nervous system and sense organs, Personal history of other endocrine, nutritional and metabolic disease, " Restless leg syndrome, Sleep apnea, obstructive, Sleep walking disorder, and Snoring. He returns in followup to the [facility] for their Rem Sleep Behavior Disorder, Sleep Apnea, and Pap Adherence Followup   .    Problem List and Orders  Problem List Items Addressed This Visit             ICD-10-CM    Obstructive sleep apnea syndrome - Primary G47.33     - Bakari Delarosa  has sleep apnea and requires treatment.  - Bakari Delarosa demonstrates adequate compliance and benefit from PAP therapy  - Continue Auto PAP 4-16 cmH20   Encouraged to use it more consistently every night  - PAP supplies placed through [facility]    - Follow-up in 3 months          Hypersomnolence G47.10     Due to RIMA, notices improvement with Sunosi. Continue at current dose         REM sleep behavior disorder G47.52     Increasing clonazepam to 1.5 mg, per patient spouse reports no difference in nighttime movements. May also help with sleep consolidation and less arousals. Denies worsening depression or suicidal ideation with medication. 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

## 2024-12-16 NOTE — ASSESSMENT & PLAN NOTE
Increasing clonazepam to 1.5 mg, per patient spouse reports no difference in nighttime movements. May also help with sleep consolidation and less arousals. Denies worsening depression or suicidal ideation with medication. He is going to see behavioral health at  to assist with his depression

## 2024-12-16 NOTE — ASSESSMENT & PLAN NOTE
- Bakari Delarosa  has sleep apnea and requires treatment.  - Bakari Delarosa demonstrates adequate compliance and benefit from PAP therapy  - Continue Auto PAP 4-16 cmH20   Encouraged to use it more consistently every night  - PAP supplies placed through [facility]    - Follow-up in 3 months

## 2024-12-17 ENCOUNTER — OFFICE VISIT (OUTPATIENT)
Dept: OPHTHALMOLOGY | Facility: CLINIC | Age: 59
End: 2024-12-17
Payer: COMMERCIAL

## 2024-12-17 DIAGNOSIS — H25.13 AGE-RELATED NUCLEAR CATARACT OF BOTH EYES: Primary | ICD-10-CM

## 2024-12-17 DIAGNOSIS — H52.7 DISORDER OF REFRACTION: ICD-10-CM

## 2024-12-17 DIAGNOSIS — D31.31 NEVUS OF CHOROID OF RIGHT EYE: ICD-10-CM

## 2024-12-17 PROCEDURE — 99214 OFFICE O/P EST MOD 30 MIN: CPT | Performed by: OPHTHALMOLOGY

## 2024-12-17 ASSESSMENT — EXTERNAL EXAM - RIGHT EYE: OD_EXAM: NORMAL

## 2024-12-17 ASSESSMENT — KERATOMETRY
OD_AXISANGLE2_DEGREES: 50
METHOD_AUTO_MANUAL: AUTOMATED
OD_K1POWER_DIOPTERS: 41.00
OD_AXISANGLE_DEGREES: 140
OS_AXISANGLE_DEGREES: 90
OS_AXISANGLE2_DEGREES: 180
OS_K2POWER_DIOPTERS: 42.50
OD_K2POWER_DIOPTERS: 43.75
OS_K1POWER_DIOPTERS: 42.50

## 2024-12-17 ASSESSMENT — VISUAL ACUITY
OD_CC: 20/20
OS_CC: 20/20
CORRECTION_TYPE: GLASSES
METHOD: SNELLEN - SINGLE

## 2024-12-17 ASSESSMENT — REFRACTION_MANIFEST
METHOD_AUTOREFRACTION: 1
OS_SPHERE: +0.50
OD_ADD: +2.50
OD_CYLINDER: -4.00
OD_CYLINDER: -3.00
OD_AXIS: 050
OD_SPHERE: +3.50
OS_ADD: +2.50
OD_SPHERE: +3.00
OD_AXIS: 050
OS_SPHERE: +0.50

## 2024-12-17 ASSESSMENT — TONOMETRY
OD_IOP_MMHG: 14
OS_IOP_MMHG: 14
IOP_METHOD: GOLDMANN APPLANATION

## 2024-12-17 ASSESSMENT — REFRACTION_WEARINGRX
OD_CYLINDER: -2.75
OS_AXIS: 074
OD_ADD: +2.50
OS_CYLINDER: -0.25
OS_SPHERE: +0.50
OD_AXIS: 049
OD_SPHERE: +3.00
OS_ADD: +2.50
SPECS_TYPE: PAL

## 2024-12-17 ASSESSMENT — PAIN SCALES - GENERAL: PAINLEVEL_OUTOF10: 0-NO PAIN

## 2024-12-17 ASSESSMENT — ENCOUNTER SYMPTOMS
GASTROINTESTINAL NEGATIVE: 0
MUSCULOSKELETAL NEGATIVE: 0
HEMATOLOGIC/LYMPHATIC NEGATIVE: 0
PSYCHIATRIC NEGATIVE: 0
CARDIOVASCULAR NEGATIVE: 0
ENDOCRINE NEGATIVE: 0
ALLERGIC/IMMUNOLOGIC NEGATIVE: 0
EYES NEGATIVE: 0
NEUROLOGICAL NEGATIVE: 0
CONSTITUTIONAL NEGATIVE: 0
RESPIRATORY NEGATIVE: 0

## 2024-12-17 ASSESSMENT — SLIT LAMP EXAM - LIDS
COMMENTS: NORMAL
COMMENTS: NORMAL

## 2024-12-17 ASSESSMENT — EXTERNAL EXAM - LEFT EYE: OS_EXAM: NORMAL

## 2024-12-17 ASSESSMENT — PATIENT HEALTH QUESTIONNAIRE - PHQ9
1. LITTLE INTEREST OR PLEASURE IN DOING THINGS: NOT AT ALL
SUM OF ALL RESPONSES TO PHQ9 QUESTIONS 1 AND 2: 0
2. FEELING DOWN, DEPRESSED OR HOPELESS: NOT AT ALL

## 2024-12-17 ASSESSMENT — CUP TO DISC RATIO
OS_RATIO: 0.35
OD_RATIO: 0.25

## 2024-12-17 NOTE — PROGRESS NOTES
Assessment/Plan   Problem List Items Addressed This Visit       Disorder of refraction     Refraction performed today for diagnostic purposes only and without specific intent to dispense Rx. Glasses/SCL Rx not dispensed today.            Choroidal nevus     Stable small inferior along nerve right eye (OD), no concerning features, will monitor with serial exam.          Age-related nuclear cataract of both eyes - Primary     Non significant cataract noted on exam. Will plan to continue to monitor with serial exam.               Provided reassurance regarding above diagnoses and care received in the office visit today. Discussed outcomes and options along with the importance of treatment compliance. Understands the importance of any follow up visits. Patient instructed to call/communicate with our office if any new issues, questions, or concerns.     Will plan to see back in 1 year full or sooner PRN

## 2024-12-17 NOTE — ASSESSMENT & PLAN NOTE
Stable small inferior along nerve right eye (OD), no concerning features, will monitor with serial exam.

## 2024-12-17 NOTE — PATIENT INSTRUCTIONS
Thank you so much for choosing me to provide your care today!    If you were dilated your vision may remain blurry   or light sensitive for several hours.    The nature of eye and vision problems can require frequent follow up, please make every effort to adhere to any future appointments.    If you have any issues, questions, or concerns,   please do not hesitate to reach out.    If you receive a survey in regards to your care today, please mention any exceptional care my office staff and/or technicians provided.    You can reach our office at this number:    [phone]    Please consider signing up for and utilizing [employer]!  This is the best way to directly reach me or other  providers

## 2025-01-06 DIAGNOSIS — E79.0 HYPERURICEMIA: ICD-10-CM

## 2025-01-06 RX ORDER — ALLOPURINOL 100 MG/1
TABLET ORAL
Qty: 90 TABLET | Refills: 3 | Status: SHIPPED | OUTPATIENT
Start: 2025-01-06

## 2025-01-14 ENCOUNTER — APPOINTMENT (OUTPATIENT)
Dept: BEHAVIORAL HEALTH | Facility: CLINIC | Age: 60
End: 2025-01-14
Payer: COMMERCIAL

## 2025-01-14 ENCOUNTER — DOCUMENTATION (OUTPATIENT)
Dept: BEHAVIORAL HEALTH | Facility: HOSPITAL | Age: 60
End: 2025-01-14

## 2025-01-14 DIAGNOSIS — F32.A DEPRESSION, UNSPECIFIED DEPRESSION TYPE: Primary | ICD-10-CM

## 2025-01-14 DIAGNOSIS — F33.41 RECURRENT MAJOR DEPRESSIVE DISORDER, IN PARTIAL REMISSION (CMS-HCC): ICD-10-CM

## 2025-01-14 DIAGNOSIS — F41.9 ANXIETY: ICD-10-CM

## 2025-01-14 PROCEDURE — 90792 PSYCH DIAG EVAL W/MED SRVCS: CPT

## 2025-01-14 RX ORDER — BUPROPION HYDROCHLORIDE 100 MG/1
100 TABLET, EXTENDED RELEASE ORAL 2 TIMES DAILY
Qty: 60 TABLET | Refills: 11 | Status: SHIPPED | OUTPATIENT
Start: 2025-01-14 | End: 2026-01-14
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
## 2025-01-21 DIAGNOSIS — E78.2 MIXED HYPERLIPIDEMIA: ICD-10-CM

## 2025-01-21 RX ORDER — ATORVASTATIN CALCIUM 40 MG/1
40 TABLET, FILM COATED ORAL DAILY
Qty: 90 TABLET | Refills: 3 | Status: SHIPPED | OUTPATIENT
Start: 2025-01-21

## 2025-01-28 ENCOUNTER — DOCUMENTATION (OUTPATIENT)
Dept: BEHAVIORAL HEALTH | Facility: HOSPITAL | Age: 60
End: 2025-01-28

## 2025-01-28 ENCOUNTER — APPOINTMENT (OUTPATIENT)
Dept: BEHAVIORAL HEALTH | Facility: CLINIC | Age: 60
End: 2025-01-28
Payer: COMMERCIAL

## 2025-01-28 DIAGNOSIS — F41.9 ANXIETY: Primary | ICD-10-CM

## 2025-01-28 DIAGNOSIS — F33.41 RECURRENT MAJOR DEPRESSIVE DISORDER, IN PARTIAL REMISSION (CMS-HCC): ICD-10-CM

## 2025-01-28 PROCEDURE — 99214 OFFICE O/P EST MOD 30 MIN: CPT

## 2025-01-28 NOTE — PROGRESS NOTES
"Outpatient Psychiatry    Subjective   Bakari Delarosa, a 59 y.o. male, presenting to Psychiatry for evaluation.  Patient is referred by Amor Vital MD for concern of depression.       This patient was seen virtually. Verbal consent was requested and obtained from Bakari Delarosa on this date, 01/14/25 for a telehealth visit.       An interactive audio and video telecommunication system which permits real time communications between the patient (at the originating site) and provider (at the distant site) was utilized to provide this telehealth service.     HPI:  Patient is a 59 year old male with past psych history significant for REM sleep behavior disorder(on klonopin 1.5mg PO QHS), anxiety and depression(on sertraline 200mg PO qday) and PMHx of RIMA, RLS, gout and headaches,s/p parathyroid surgery for 1 HPT in his early 50's (last TSH wnl 12/2023) who presents today with concern of worsening depression in the setting of ongoing medical issues. Per note from PCP: patient has been having lack of motivation, chronic fatigue, and overeating although his depressive symptoms have improved on high dose zoloft. He was previously on prozac and did well, although chart review says that he reported side effects-which he cannot currently recall. Patient wants to avoid taking many medications and wants to keep it to the \"most limited number possible for long term care.\" Patient and PCP are looking for to address his chronic fatigue and avolition by potentially switching his zoloft to an SNRI rather than adjunct therapy to limit medications.     On assessment, patient feels medication working well-he feels less tired and has an \"overall better outlook on life\" and just \"happier overall.\" He is also happy to report that his appetite has lessened and that his motivation has improved. He denies any side effects. He denies current stressors, SI/HI/AVH or repetitive skin picking behaviors.        Psychiatric Review Of " Systems:  Depressive Symptoms: anhedonia, appetite increased, and sleep decreased  for last two years  Manic Symptoms: negative  Anxiety Symptoms: General Anxiety Disorder (GUANACO)GUANACO Behaviors: difficult to control worry at bed time but says this is not so bad now, sometimes impacts sleep  Psychotic Symptoms: negative  Other Symptoms: denies  **Otherwise, as described in HPI.     Current Medications:    Current Outpatient Medications:     allopurinol (Zyloprim) 100 mg tablet, TAKE 1 TABLET DAILY (TOTAL DOSE 400 MG), Disp: 90 tablet, Rfl: 3    allopurinol (Zyloprim) 300 mg tablet, TAKE 1 TABLET DAILY, Disp: 90 tablet, Rfl: 3    atorvastatin (Lipitor) 40 mg tablet, TAKE 1 TABLET DAILY, Disp: 90 tablet, Rfl: 3    buPROPion SR (Wellbutrin SR) 100 mg 12 hr tablet, Take 1 tablet (100 mg) by mouth 2 times a day. Do not crush, chew, or split., Disp: 60 tablet, Rfl: 11    chlorthalidone (Hygroton) 25 mg tablet, Take 1 tablet (25 mg) by mouth once daily., Disp: 90 tablet, Rfl: 3    cholecalciferol (Vitamin D-3) 25 MCG (1000 UT) tablet, Take by mouth., Disp: , Rfl:     clonazePAM (KlonoPIN) 1 mg tablet, Take 1.5 tablets (1.5 mg) by mouth once daily at bedtime., Disp: 135 tablet, Rfl: 0    fluticasone (Flonase) 50 mcg/actuation nasal spray, Administer 1 spray into each nostril once daily. Shake gently. Before first use, prime pump. After use, clean tip and replace cap., Disp: 16 g, Rfl: 0    magnesium chloride (MagDelay) 64 mg EC tablet, Take 1 tablet (64 mg) by mouth once daily., Disp: 90 tablet, Rfl: 3    potassium chloride CR (Klor-Con M20) 20 mEq ER tablet, Take 1 tablet (20 mEq) by mouth once daily. Do not crush or chew., Disp: 90 tablet, Rfl: 3    sertraline (Zoloft) 100 mg tablet, Take 2 tablets (200 mg) by mouth once daily., Disp: 180 tablet, Rfl: 3    sildenafil (Viagra) 100 mg tablet, Take 1 tablet (100 mg) by mouth once daily as needed for erectile dysfunction., Disp: , Rfl:     solriamfetoL (Sunosi) 150 mg tablet,  "Take 150 mg by mouth once daily., Disp: 30 tablet, Rfl: 2    SUMAtriptan (Imitrex) 100 mg tablet, TAKE 1 TABLET ONE TIME IF NEEDED FOR MIGRAINE FOR UP  DOSES., Disp: 9 tablet, Rfl: 27    ubrogepant (Ubrelvy) 100 mg tablet tablet, Take 1 tablet (100 mg) by mouth if needed (first sign of migraine)., Disp: 12 tablet, Rfl: 5    verapamil ER (Veralan PM) 240 mg 24 hr capsule, Take 1 capsule (240 mg) by mouth once daily at bedtime. Do not crush or chew., Disp: 90 capsule, Rfl: 3    zinc gluconate 50 mg tablet, Take by mouth once daily., Disp: , Rfl:     Medical History:  Past Medical History:   Diagnosis Date    Anxiety     Depression     Headache     Personal history of other diseases of the digestive system     History of gastroesophageal reflux (GERD)    Personal history of other diseases of the nervous system and sense organs     History of obstructive sleep apnea    Personal history of other endocrine, nutritional and metabolic disease     History of thyroid disorder    Restless leg syndrome     Sleep apnea, obstructive     Sleep walking disorder     Snoring        Substance Use History:  Tobacco use: cigar, once every 3-4 months. Former smoker and used to use chew \"years ago\"  Use of alcohol:  social drinker, twice a week will have 6 beers on average. Denies history of heavy use.  Use of caffeine:  lots of iced tea, stops hours before bed.  Use of other substances: denies  Legal consequences of substance use: [place]ABELLA in 2003  Substance use disorder treatment: denied    Record Review: moderate    OARRS:  No data recorded    Is the patient prescribed a combination of a benzodiazepine and opioid?  No    Recent Results (from the past 8760 hours)   Drug Screen, Urine With Reflex to Confirmation    Collection Time: 08/21/24  4:53 PM   Result Value Ref Range    Amphetamine Screen, Urine Presumptive Negative Presumptive Negative    Barbiturate Screen, Urine Presumptive Negative Presumptive Negative    Benzodiazepines " "Screen, Urine Presumptive Negative Presumptive Negative    Cannabinoid Screen, Urine Presumptive Negative Presumptive Negative    Cocaine Metabolite Screen, Urine Presumptive Negative Presumptive Negative    Fentanyl Screen, Urine Presumptive Negative Presumptive Negative    Opiate Screen, Urine Presumptive Negative Presumptive Negative    Oxycodone Screen, Urine Presumptive Negative Presumptive Negative    PCP Screen, Urine Presumptive Negative Presumptive Negative    Methadone Screen, Urine Presumptive Negative Presumptive Negative   Drug Screen, Urine    Collection Time: 03/22/24  1:16 PM   Result Value Ref Range    Amphetamine Screen, Urine Presumptive Negative Presumptive Negative    Barbiturate Screen, Urine Presumptive Negative Presumptive Negative    Benzodiazepines Screen, Urine Presumptive Negative Presumptive Negative    Cannabinoid Screen, Urine Presumptive Negative Presumptive Negative    Cocaine Metabolite Screen, Urine Presumptive Negative Presumptive Negative    Fentanyl Screen, Urine Presumptive Negative Presumptive Negative    Opiate Screen, Urine Presumptive Negative Presumptive Negative    Oxycodone Screen, Urine Presumptive Negative Presumptive Negative    PCP Screen, Urine Presumptive Negative Presumptive Negative    Methadone Screen, Urine Presumptive Negative Presumptive Negative     Results are as expected.   Controlled Substance Agreement:  Date of the Last Agreement: N/A    Objective   Mental Status Exam  Appearance: patient seen virtually, white male, appears stated age, no acute distress  Attitude: Calm, cooperative, and engaged in conversation.  Behavior: Appropriate eye contact.   Motor Activity: No psychomotor agitation or retardation. No abnormal movements, tremors or tics. No evidence of extrapyramidal symptoms or tardive dyskinesia.  Speech: Regular rate, rhythm, volume. Spontaneous, no pressured speech.  Mood: \"much better!\"  Affect: Euthymic, full range, mood congruent.  Thought " Process: Linear, logical, and goal-directed. No loose associations or gross thought disorganization.  Thought Content: Denied current suicidal ideation or thoughts of harm to self, denied homicidal ideation or thoughts of harm to others. No delusional thinking elicited. No perseverations or obsessions identified.   Perception: Did not endorse auditory or visual hallucinations, did not appear to be responding to hallucinatory stimuli.   Cognition: Alert, oriented x3. Preserved attention span and concentration, recent and remote memory. Adequate fund of knowledge. No deficits in language.   Insight: Fair, in regards to understanding mental health condition  Judgement: Fair      Risk Assessment:  Risk of harm to self: Low Risk -- Risk factors include: Depression and Gender Protective factors include:Denies current suicidal ideation, Denies history of suicide attempts , Future-oriented talk , Willingness to seek help and support , Age, Access to a variety of clinical interventions , Receiving and engaged in care for mental, physical, and substance use disorders , History of adhering to treatment recommendations and/or prescribed medication regimen , Support through ongoing medical and mental healthcare relationships , Current/history of good response to treatment/meds , and Interpersonal relationships and supports, e.g., family, friends, peers, community      Risk of harm to others: Low Risk - Risk factors include: No significant risk factors identified on screening. Protective factors include: Lack of known history of violent ideation , Lack of known access to firearms , Sense of community, availability/access to resources and support , Sense of optimism, hope , Affect regulation , Sense of self-efficacy, internal locus of control , and Positive, pro-social family/peer network     Assessment and Plan)  Patient is a 59 year old male with past psych history significant for REM sleep behavior disorder(on klonopin 1.5mg PO  "QHS), anxiety and depression(on sertraline 200mg PO qday) and PMHx of RIMA, RLS, gout and headaches,s/p parathyroid surgery for 1 HPT in his early 50's (last TSH wnl 12/2023) who presents today with concern of worsening depression in the setting of ongoing medical issues. Per note from PCP: patient has been having lack of motivation, chronic fatigue, and overeating although his depressive symptoms have improved on high dose zoloft. He was previously on prozac and did well, although chart review says that he reported side effects-which he cannot currently recall. Patient wants to avoid taking many medications and wants to keep it to the \"most limited number possible for long term care.\" Patient and PCP are looking for to address his chronic fatigue and avolition by potentially switching his zoloft to an SNRI rather than adjunct therapy to limit medications.      Today, Mr. Delarosa states his depression and anxiety are better as well as his fatigue and lack of motivation. He is tolerating buproprion well and has no complaints currently. Recommendations as detailed below.    Impression)  -Depression, moderate, recurrent in partial remission  -Anxiety, by history  -REM Sleep behavior disorder, by history  -RIMA, by history     Plan/Recommendations:  Medications:   Continue Bupropion 100mg SR PO BID as adjunct therapy.  Continue zoloft 200mg PO qday for depression, anxiety.   Orders: None at this time  Follow up: with PCP in the next 3 months.   Call  Psychiatry at [phone] with issues.  For [facility] residents, [employer] is a 24/7 hotline you can call for assistance [[phone]]. Please call [phone] or go to your closest Emergency Room if you feel unsafe. This includes thoughts of hurting yourself or anyone else, or having other troubles such as hearing voices, seeing visions, or having new and scary thoughts about the people around you.    Review with patient: Treatment plan reviewed with the patient. " He expressed understanding.  Time Spent: 5 min    Prep time: 10  Direct patient time: 20  Documentation time: 10  Total time: 40 min    Sandy Rivera MD   Psychiatry PGY2

## 2025-02-18 DIAGNOSIS — G47.33 OBSTRUCTIVE SLEEP APNEA SYNDROME: ICD-10-CM

## 2025-02-18 RX ORDER — SOLRIAMFETOL 150 MG/1
150 TABLET, FILM COATED ORAL DAILY
Qty: 30 TABLET | Refills: 2 | Status: SHIPPED | OUTPATIENT
Start: 2025-02-18 | End: 2025-05-19

## 2025-02-18 NOTE — TELEPHONE ENCOUNTER
PT SPOUSE REQUESTING REFILL OF SUNOSI #30 FOR 30 DAYS TO GET PT TO NEXT APPOINTMENT ON 3/17/2025    This RN checked OARRS and it is consist with prescribed medications. Patient has been filling Rx appropriately. Prescription request sent to provider ELVIA to review.    UDS 8/2024  CSA 10/2024

## 2025-02-19 ENCOUNTER — APPOINTMENT (OUTPATIENT)
Dept: PRIMARY CARE | Facility: CLINIC | Age: 60
End: 2025-02-19
Payer: COMMERCIAL

## 2025-02-19 VITALS
BODY MASS INDEX: 34.96 KG/M2 | HEART RATE: 103 BPM | WEIGHT: 265 LBS | DIASTOLIC BLOOD PRESSURE: 82 MMHG | SYSTOLIC BLOOD PRESSURE: 128 MMHG

## 2025-02-19 DIAGNOSIS — F33.41 RECURRENT MAJOR DEPRESSIVE DISORDER, IN PARTIAL REMISSION (CMS-HCC): Primary | ICD-10-CM

## 2025-02-19 DIAGNOSIS — F51.5 NIGHTMARE: ICD-10-CM

## 2025-02-19 PROCEDURE — 1036F TOBACCO NON-USER: CPT | Performed by: INTERNAL MEDICINE

## 2025-02-19 PROCEDURE — 99213 OFFICE O/P EST LOW 20 MIN: CPT | Performed by: INTERNAL MEDICINE

## 2025-02-19 RX ORDER — PRAZOSIN HYDROCHLORIDE 1 MG/1
1 CAPSULE ORAL NIGHTLY
Qty: 90 CAPSULE | Refills: 0 | Status: SHIPPED | OUTPATIENT
Start: 2025-02-19 | End: 2025-05-20

## 2025-02-19 ASSESSMENT — PAIN SCALES - GENERAL: PAINLEVEL_OUTOF10: 0-NO PAIN

## 2025-02-19 ASSESSMENT — ENCOUNTER SYMPTOMS
COUGH: 0
PALPITATIONS: 0
POLYDIPSIA: 0
SHORTNESS OF BREATH: 0
CHILLS: 0
FEVER: 0

## 2025-02-19 NOTE — PROGRESS NOTES
Subjective   Patient ID: Bkaari Delarosa is a 59 y.o. male who presents for Follow-up.    59-year-old male presents today for follow-up after specialist evaluation for complex persistent depression with anhedonia.  I appreciate their input regarding his case he has started Wellbutrin per their recommendations is currently taking 100 mg daily.  He has tolerated the dose well initially was on 200 but cut the dose down after feeling a little loopy.  He was initially started on 200 as the initial dose likely it was just an aggressive dosing method and he will probably be good for future dose increases if needed.  There have been symptomatic improvements on the adjusted dosing  And the patient has only been on it for about 3 to 4 weeks now.  A little early in my opinion to consider adjustments they will continue to monitor over the next 4 weeks and update me in about 4 weeks with an updated opinion regarding the total levels of control improvements in mood and motivation.  At that time I will consider further adjustments if required likely switch him to bupropion  at that time if a dose adjustment would be beneficial.    Regarding additional measures, the patient has multiple chronic sleep disorders but among them includes nightmares on a regular recurring basis.  He does not experience sleep paralysis efficiently and effectively and sometimes acts out his dreams.  We discussed additions of prazosin at low-dose for nightmares.  We will update on this in 4 weeks as well         Review of Systems   Constitutional:  Negative for chills and fever.   Respiratory:  Negative for cough and shortness of breath.    Cardiovascular:  Negative for chest pain and palpitations.   Endocrine: Negative for polydipsia and polyuria.       Objective   /82   Pulse 103   Wt 120 kg (265 lb)   BMI 34.96 kg/m²     Physical Exam  Constitutional:       Appearance: Normal appearance.   HENT:      Head: Normocephalic and atraumatic.    Eyes:      Extraocular Movements: Extraocular movements intact.      Pupils: Pupils are equal, round, and reactive to light.   Neck:      Thyroid: No thyroid mass or thyromegaly.   Cardiovascular:      Rate and Rhythm: Normal rate and regular rhythm.      Heart sounds: No murmur heard.     No friction rub. No gallop.   Pulmonary:      Effort: No respiratory distress.      Breath sounds: No wheezing, rhonchi or rales.   Musculoskeletal:      Cervical back: Neck supple.      Right lower leg: No edema.      Left lower leg: No edema.   Neurological:      Mental Status: He is alert.         Assessment/Plan   Assessment & Plan  Recurrent major depressive disorder, in partial remission (CMS-HCC)         Nightmare    Orders:    prazosin (Minipress) 1 mg capsule; Take 1 capsule (1 mg) by mouth once daily at bedtime.

## 2025-03-17 ENCOUNTER — APPOINTMENT (OUTPATIENT)
Dept: SLEEP MEDICINE | Facility: CLINIC | Age: 60
End: 2025-03-17
Payer: COMMERCIAL

## 2025-03-17 VITALS
SYSTOLIC BLOOD PRESSURE: 130 MMHG | DIASTOLIC BLOOD PRESSURE: 76 MMHG | HEART RATE: 95 BPM | WEIGHT: 265 LBS | BODY MASS INDEX: 35.12 KG/M2 | OXYGEN SATURATION: 96 % | HEIGHT: 73 IN

## 2025-03-17 DIAGNOSIS — G47.52 REM SLEEP BEHAVIOR DISORDER: ICD-10-CM

## 2025-03-17 DIAGNOSIS — G47.33 OBSTRUCTIVE SLEEP APNEA SYNDROME: Primary | ICD-10-CM

## 2025-03-17 DIAGNOSIS — G47.19 EXCESSIVE DAYTIME SLEEPINESS: ICD-10-CM

## 2025-03-17 PROCEDURE — 99214 OFFICE O/P EST MOD 30 MIN: CPT | Performed by: INTERNAL MEDICINE

## 2025-03-17 PROCEDURE — 3008F BODY MASS INDEX DOCD: CPT | Performed by: INTERNAL MEDICINE

## 2025-03-17 PROCEDURE — 1036F TOBACCO NON-USER: CPT | Performed by: INTERNAL MEDICINE

## 2025-03-17 RX ORDER — CLONAZEPAM 2 MG/1
2 TABLET ORAL NIGHTLY
Qty: 30 TABLET | Refills: 2 | Status: SHIPPED | OUTPATIENT
Start: 2025-03-17 | End: 2025-06-15

## 2025-03-17 RX ORDER — DEXTROAMPHETAMINE SACCHARATE, AMPHETAMINE ASPARTATE, DEXTROAMPHETAMINE SULFATE AND AMPHETAMINE SULFATE 2.5; 2.5; 2.5; 2.5 MG/1; MG/1; MG/1; MG/1
10 TABLET ORAL 3 TIMES DAILY
Qty: 90 TABLET | Refills: 0 | Status: SHIPPED | OUTPATIENT
Start: 2025-03-17 | End: 2025-04-16

## 2025-03-17 ASSESSMENT — PAIN SCALES - GENERAL: PAINLEVEL_OUTOF10: 0-NO PAIN

## 2025-03-17 ASSESSMENT — SLEEP AND FATIGUE QUESTIONNAIRES
HOW LIKELY ARE YOU TO NOD OFF OR FALL ASLEEP WHILE SITTING QUIETLY AFTER LUNCH WITHOUT ALCOHOL: WOULD NEVER DOZE
HOW LIKELY ARE YOU TO NOD OFF OR FALL ASLEEP WHILE SITTING AND TALKING TO SOMEONE: WOULD NEVER DOZE
HOW LIKELY ARE YOU TO NOD OFF OR FALL ASLEEP IN A CAR, WHILE STOPPED FOR A FEW MINUTES IN TRAFFIC: WOULD NEVER DOZE
HOW LIKELY ARE YOU TO NOD OFF OR FALL ASLEEP WHILE SITTING AND READING: HIGH CHANCE OF DOZING
HOW LIKELY ARE YOU TO NOD OFF OR FALL ASLEEP WHILE LYING DOWN TO REST IN THE AFTERNOON WHEN CIRCUMSTANCES PERMIT: HIGH CHANCE OF DOZING
SITING INACTIVE IN A PUBLIC PLACE LIKE A CLASS ROOM OR A MOVIE THEATER: WOULD NEVER DOZE
ESS-CHAD TOTAL SCORE: 9
HOW LIKELY ARE YOU TO NOD OFF OR FALL ASLEEP WHEN YOU ARE A PASSENGER IN A CAR FOR AN HOUR WITHOUT A BREAK: WOULD NEVER DOZE
HOW LIKELY ARE YOU TO NOD OFF OR FALL ASLEEP WHILE WATCHING TV: HIGH CHANCE OF DOZING

## 2025-03-17 NOTE — ASSESSMENT & PLAN NOTE
He has RBD and fell out of bed. I recommended that we increase the clonazepam to 2 mg. I also recommended that he work with his psychiatrist to try to get him managed on a medication other than zoloft which causes RBD and RLS. I suspect this is a large contributor and possibly the main contributor of his RLS symptoms. I would like to maintain clonazepam at 2 mg due to risk of injury. He fell out of bed due to a disruptive dream.  Typically serotonergic medications trigger RBD

## 2025-03-17 NOTE — PROGRESS NOTES
Patient: Bakari Delarosa    [id number]  : 1965 -- AGE 59 y.o.    Provider: Ruperto Orellana MD     Location [facility]   Service Date: 3/17/2025              [facility]  Followup Visit Note  Subjective   Patient ID: Bakari Delarosa is a 59 y.o. male who presents for Sleep Apnea, Pap Adherence Followup, and Rem Sleep Behavior Disorder.  HPI    Prior Sleep History:  PSG on CPAP on 3/21/2024.  Started 11 cm H2O and increased to 13 cm H2O.  He had severe sleep fragmentation.  His REM was delayed to the early morning.  PLMI 52.9 with 32% associated with arousals.  PLM AI 16.9.  He did exhibit REM sleep without atonia.  There were not any vocalizations or abnormal motor behaviors during this time     MSLT 3/22/2024: Mean sleep latency 16.8 with no sleep onset REM periods.  Sleep log determined average sleep is 6 to 8 hours with napping behavior     2024: Office Visit: Dr. Ruperto Orellana: Pramipexole exacerbated RBD symptoms. Sent Ambien, had no effect on RBD symptoms     He does endorse an urge to move his legs around, typically in the evening or bed. Felt like an uncontrolled itch. Currently not any better. Continues to act of dreams and accidentally struck bed partner.  Prescribed clonazepam 0.5 mg.  Alprazolam interacts with his verapamil.  RLS and PLMD.  Should get some improvement with clonazepam dual effect   10/14/2024: Office Visit: Dr. Ruperto Orellana: Bakari presents for medication management for his RLS managed with 0.5 mg clonazepam.  Bed partner reports that she has not noticed any reduction in his nocturnal activity with movements of arms and legs despite being on clonazepam 0.5 mg.  She reports that he is sleeping more.  She reports that he sleeps over 12 hours on the weekends.  Typical bedtime is 8 PM, typical wake time 5 AM on workdays     Typical bedtime is 8 PM, typical wake time is 8 AM on nonworking days and will often need to take  a nap midday.  He previously tried modafinil was unsuccessful in managing his daytime sleepiness symptoms    12/16/2024: Office Visit: Dr. Ruperto Orellana:He has RBD which we are trying to manage with clonazepam.  RLS also utilizing clonazepam.  For his excessive time sleepiness with RIMA he is on Sunosi.  Per patient his spouse reports that she has not noticed a difference  He believes that he is more awake during the nap  He will nap on his days off     Sleep Schedule: On work days He goes to bed at 8 PM and his sleep latency is 30 minutes. He wakes by  4-4:30 AM. He has 5 awakenings per night that last for few minutes. He gets about 5 hours of sleep per night.   Non working days: He goes to bed at 8 PM and his sleep latency is 30 minutes. He wakes by  5 AM. He has 5 awakenings per night that last for few minutes. He gets about 5-6 hours of sleep per night.        PAP Adherence:  DURABLE MEDICAL EQUIPMENT COMPANY: [employer]  Machine: THERAPY: [employer] DREAM[employer] AUTO-PAP PRESSURE SETTINGS: 4 cm H2O - 16 cm H2O   Issues with therapy: none        Daytime Symptoms     Patient report some daytime symptoms including:   Patient denies daytime symptoms including: Denies: excessive daytime sleepiness napping on weekends     ESS: 8   Obstructive sleep apnea syndrome - Primary G47.33 Continue Auto PAP 4-16 cmH20 . Encouraged to use it more consistently every night. PAP supplies placed through [employer]   Hypersomnolence G47.10. Due to RIMA, notices improvement with Sunosi. Continue at current dose   REM sleep behavior disorder G47.52.   Increasing clonazepam to 1.5 mg, per patient spouse reports no difference in nighttime movements. May also help with sleep consolidation and less arousals. Denies worsening depression or suicidal ideation with medication. He is going to see behavioral health at  to assist with his depression.   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   Reports 5.5 hours of  "sleep, but is in bed for 8 hours per patient. Periodic limb movement disorder (PLMD)     Current Sleep History:  Bakari presents for medication management he is on clonazepam for RBD and sunosi for RIMA associated EDS    A downloaded compliance report was reviewed and was interpreted by myself as follows:  > 4 hour compliance was 83.3 %, with an average use of 6 hours and 13 minutes, with a residual AHI 3.0 .     Bakari Delarosa reports good benefit from his device.     He reports that he is still experiencing excessive daytime sleepiness and has not noticed improvement with Sunosi. Spouse reports that his daytime sleepiness appears worse then previously.   He is still having RBD symptoms despite going to 1.5 mg of clonazepam. He reports having vivid dreams and he has struck his bed partner and he fell out of bed at the end of January.  He decreased the clonazepam down to 1 mg per request of his psychiatrist  Still having dream enactment behavior.  Wife reports that he fell out bed. She went into bed and he was moving his legs and he did not stop moving them and he fell out of bed. His spouse reports that he fell out of bed and a blank stare and he did not respond, he reports that he was mostly upset that he fell out of bed and did not responde. He left and went into the other room.  He did recall that he was chasing something in his dream. He reports that he is sleeping longer and better, but he still feels daytime sleepiness. Wife reports that he looks like he has not slept in a week. Wife reports improvement in mood.   Bedtime 8-9 PM  Falls asleep within minutes  Wakes up at 4:30 AM  7 hours/day    Weekends wakes at 7-8 AM  He will eat and take a nap within an hour and he falls asleep watching TV. ESS: 9     Review of Systems  Review of systems negative except as per HPI  Objective   /76   Pulse 95   Ht 1.854 m (6' 1\")   Wt 120 kg (265 lb)   SpO2 96%   BMI 34.96 kg/m²    PREVIOUS WEIGHTS:  Wt Readings " from Last 3 Encounters:   03/17/25 120 kg (265 lb)   02/19/25 120 kg (265 lb)   12/16/24 120 kg (265 lb)     Physical Exam  PHYSICAL EXAM: GENERAL: alert pleasant and cooperative no acute distress  PSYCH EXAM: alert,oriented, in NAD with a full range of affect, normal behavior and no psychotic features  Lab Results   Component Value Date    IRON 157 10/03/2023    TIBC 380 10/03/2023    FERRITIN 137 07/12/2023        Assessment/Plan   Problem List Items Addressed This Visit             ICD-10-CM    Obstructive sleep apnea syndrome - Primary G47.33     - Bakari Delarosa  has sleep apnea and requires treatment.  His excessive daytime sleepiness is not responding to sunosi. We will try adderall 10 mg BID. I instructed him that he can increase it to 15 mg PO BID depending on his response to the medication  - Bakari Delarosa demonstrates good compliance and benefit from PAP therapy  - Continue Auto PAP 4-16 cmH20   - Order for renewal of PAP supplies placed through [employer]    - Follow-up in 12 months          Relevant Medications    amphetamine-dextroamphetamine (AdderalL) 10 mg tablet    REM sleep behavior disorder G47.52     He has RBD and fell out of bed. I recommended that we increase the clonazepam to 2 mg. I also recommended that he work with his psychiatrist to try to get him managed on a medication other than zoloft which causes RBD and RLS. I suspect this is a large contributor and possibly the main contributor of his RLS symptoms. I would like to maintain clonazepam at 2 mg due to risk of injury. He fell out of bed due to a disruptive dream.  Typically serotonergic medications trigger RBD         Relevant Medications    clonazePAM (KlonoPIN) 2 mg tablet    Excessive daytime sleepiness G47.19     Is somewhat unclear. MSLT was negative, but wife corroborates his symptoms. He is getting 7 hours of sleep during the week and more on the weekends, still having difficulty staying awake. EDS due to RIMA is the current  etiology most likely. He has not responded to modafinil or sunsoi

## 2025-03-17 NOTE — ASSESSMENT & PLAN NOTE
Is somewhat unclear. MSLT was negative, but wife corroborates his symptoms. He is getting 7 hours of sleep during the week and more on the weekends, still having difficulty staying awake. EDS due to RIMA is the current etiology most likely. He has not responded to modafinil or sunsoi

## 2025-03-17 NOTE — ASSESSMENT & PLAN NOTE
- Bakari Delarosa  has sleep apnea and requires treatment.  His excessive daytime sleepiness is not responding to sunosi. We will try adderall 10 mg BID. I instructed him that he can increase it to 15 mg PO BID depending on his response to the medication  - Bakari Delarosa demonstrates good compliance and benefit from PAP therapy  - Continue Auto PAP 4-16 cmH20   - Order for renewal of PAP supplies placed through [employer]    - Follow-up in 12 months

## 2025-03-21 DIAGNOSIS — F33.41 RECURRENT MAJOR DEPRESSIVE DISORDER, IN PARTIAL REMISSION (CMS-HCC): Primary | ICD-10-CM

## 2025-03-21 RX ORDER — SERTRALINE HYDROCHLORIDE 50 MG/1
TABLET, FILM COATED ORAL
Qty: 60 TABLET | Refills: 0 | Status: SHIPPED | OUTPATIENT
Start: 2025-03-21 | End: 2025-04-20

## 2025-03-21 RX ORDER — DULOXETIN HYDROCHLORIDE 30 MG/1
CAPSULE, DELAYED RELEASE ORAL
Qty: 170 CAPSULE | Refills: 0 | Status: SHIPPED | OUTPATIENT
Start: 2025-03-21 | End: 2025-06-19

## 2025-04-10 ENCOUNTER — TELEPHONE (OUTPATIENT)
Dept: PRIMARY CARE | Facility: CLINIC | Age: 60
End: 2025-04-10
Payer: COMMERCIAL

## 2025-04-10 DIAGNOSIS — F33.41 RECURRENT MAJOR DEPRESSIVE DISORDER, IN PARTIAL REMISSION (CMS-HCC): ICD-10-CM

## 2025-04-10 RX ORDER — DULOXETIN HYDROCHLORIDE 60 MG/1
60 CAPSULE, DELAYED RELEASE ORAL DAILY
Qty: 90 CAPSULE | Refills: 3 | Status: SHIPPED | OUTPATIENT
Start: 2025-04-10 | End: 2025-04-10 | Stop reason: SDUPTHER

## 2025-04-10 RX ORDER — DULOXETIN HYDROCHLORIDE 60 MG/1
60 CAPSULE, DELAYED RELEASE ORAL DAILY
Qty: 90 CAPSULE | Refills: 3 | Status: SHIPPED | OUTPATIENT
Start: 2025-04-10 | End: 2026-04-10

## 2025-04-18 DIAGNOSIS — F33.41 RECURRENT MAJOR DEPRESSIVE DISORDER, IN PARTIAL REMISSION (CMS-HCC): ICD-10-CM

## 2025-04-18 RX ORDER — DULOXETIN HYDROCHLORIDE 60 MG/1
60 CAPSULE, DELAYED RELEASE ORAL DAILY
Qty: 90 CAPSULE | Refills: 3 | Status: SHIPPED | OUTPATIENT
Start: 2025-04-18 | End: 2026-04-18

## 2025-04-22 ENCOUNTER — APPOINTMENT (OUTPATIENT)
Dept: SLEEP MEDICINE | Facility: CLINIC | Age: 60
End: 2025-04-22
Payer: COMMERCIAL

## 2025-04-22 DIAGNOSIS — G47.33 OBSTRUCTIVE SLEEP APNEA SYNDROME: ICD-10-CM

## 2025-04-22 DIAGNOSIS — G47.19 EXCESSIVE DAYTIME SLEEPINESS: ICD-10-CM

## 2025-04-22 DIAGNOSIS — G25.81 RESTLESS LEG SYNDROME: ICD-10-CM

## 2025-04-22 DIAGNOSIS — G47.52 REM SLEEP BEHAVIOR DISORDER: Primary | ICD-10-CM

## 2025-04-22 DIAGNOSIS — F51.5 NIGHTMARE DISORDER: ICD-10-CM

## 2025-04-22 DIAGNOSIS — E83.10 IRON METABOLISM DISORDER: ICD-10-CM

## 2025-04-22 PROCEDURE — 1036F TOBACCO NON-USER: CPT | Performed by: INTERNAL MEDICINE

## 2025-04-22 PROCEDURE — 99214 OFFICE O/P EST MOD 30 MIN: CPT | Performed by: INTERNAL MEDICINE
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
## 2025-04-22 NOTE — PROGRESS NOTES
Patient: Bakari Delarosa    [id number]  : 1965 -- AGE 59 y.o.    Provider: Ruperto Orellana MD     Location [facility]   Service Date: 2025              [facility]  Followup Visit Note  Virtual or Telephone Consent  An interactive audio and video telecommunication system which permits real time communications between the patient (at the originating site) and provider (at the distant site) was utilized to provide this telehealth service.   Verbal consent was requested and obtained from Bakari Delarosa on this date, 25 for a telehealth visit and the patient's location was confirmed at the time of the visit.     Subjective   Patient ID: Bakari Delarosa is a 59 y.o. male who presents for Sleep Apnea and Pap Adherence Followup.  HPI    Current Sleep History:  Bakari presents for an earlier appointment due to his spouses concern for increased sedation and sleeping. He was recently started on a taper off his sertraline and was experiencing excessive daytime sleepiness with an inability to stay awake. He reports that he slept for 10-12 hours. Today and yesterday he reports that he is awake and alert and not requiring coffee. Yesterday was his first day off zoloft completely and he is now on duloxetine. He does not recall dream enactment behavior since his last visit. Spouse is not in the bedroom due to her insulin pump. He has not been sleepy over the last 2 days. He reports that he is taking the adderall once in the morning and not requiring the afternoon dose.    ESS: 9   A downloaded compliance report was reviewed and was interpreted by myself as follows:  > 4 hour compliance was 66.7 %, with an average use of 6 hours and 19 minutes, with a residual AHI 3.3 on AutoPAP 4-16 cmH2O .     Bakari Delarosa reports good benefit from his device.   Review of Systems  Review of systems negative except as per HPI  Objective   There were no vitals taken for  "this visit.   PREVIOUS WEIGHTS:  Wt Readings from Last 3 Encounters:   03/17/25 120 kg (265 lb)   02/19/25 120 kg (265 lb)   12/16/24 120 kg (265 lb)     Physical Exam  PHYSICAL EXAM: GENERAL: alert pleasant and cooperative no acute distress  PSYCH EXAM: alert,oriented, in NAD with a full range of affect, normal behavior and no psychotic features    Lab Results   Component Value Date    IRON 157 10/03/2023    TIBC 380 10/03/2023    FERRITIN 137 07/12/2023        Assessment/Plan   Problem List Items Addressed This Visit           ICD-10-CM    Obstructive sleep apnea syndrome G47.33    Previously reinforced the importance of CPAP use. 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 Will continue to monitor. I did discuss that he should consider weaning off the adderall starting on the weekend to monitor his response to 5 mg, then off it entirely. He may have just been very sleep deprived and \"caught up on some sleep\", when he had time off. 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

## 2025-04-22 NOTE — ASSESSMENT & PLAN NOTE
"This appears to be due to his zoloft and was significant with weaning, but he is not exhibiting excessive daytime sleepiness over the last 2 days. Will continue to monitor. I did discuss that he should consider weaning off the adderall starting on the weekend to monitor his response to 5 mg, then off it entirely. He may have just been very sleep deprived and \"caught up on some sleep\", when he had time off. Will continue to monitor  "

## 2025-04-24 ENCOUNTER — APPOINTMENT (OUTPATIENT)
Dept: UROLOGY | Facility: CLINIC | Age: 60
End: 2025-04-24
Payer: COMMERCIAL

## 2025-04-24 VITALS — BODY MASS INDEX: 34.96 KG/M2 | HEIGHT: 73 IN | TEMPERATURE: 97.7 F

## 2025-04-24 DIAGNOSIS — N52.9 ERECTILE DYSFUNCTION, UNSPECIFIED ERECTILE DYSFUNCTION TYPE: ICD-10-CM

## 2025-04-24 DIAGNOSIS — R17 ELEVATED BILIRUBIN: ICD-10-CM

## 2025-04-24 DIAGNOSIS — N52.8 OTHER MALE ERECTILE DYSFUNCTION: ICD-10-CM

## 2025-04-24 DIAGNOSIS — R31.29 MICROSCOPIC HEMATURIA: ICD-10-CM

## 2025-04-24 DIAGNOSIS — N20.0 CALCULUS OF KIDNEY: Primary | ICD-10-CM

## 2025-04-24 LAB
POC APPEARANCE, URINE: CLEAR
POC BILIRUBIN, URINE: ABNORMAL
POC BLOOD, URINE: ABNORMAL
POC COLOR, URINE: YELLOW
POC GLUCOSE, URINE: NEGATIVE MG/DL
POC KETONES, URINE: ABNORMAL MG/DL
POC LEUKOCYTES, URINE: NEGATIVE
POC NITRITE,URINE: NEGATIVE
POC PH, URINE: 5.5 PH
POC PROTEIN, URINE: ABNORMAL MG/DL
POC SPECIFIC GRAVITY, URINE: 1.02
POC UROBILINOGEN, URINE: 1 EU/DL

## 2025-04-24 PROCEDURE — G2211 COMPLEX E/M VISIT ADD ON: HCPCS | Performed by: UROLOGY

## 2025-04-24 PROCEDURE — 81003 URINALYSIS AUTO W/O SCOPE: CPT | Performed by: UROLOGY

## 2025-04-24 PROCEDURE — 99214 OFFICE O/P EST MOD 30 MIN: CPT | Performed by: UROLOGY

## 2025-04-24 PROCEDURE — 1036F TOBACCO NON-USER: CPT | Performed by: UROLOGY

## 2025-04-24 ASSESSMENT — PAIN SCALES - GENERAL: PAINLEVEL_OUTOF10: 0-NO PAIN

## 2025-04-24 NOTE — PATIENT INSTRUCTIONS
Your provider has ordered blood work to be drawn.   has partnered with [employer], your orders can be sent electronically.  If you choose to use an outside lab, please take a copy of your lab requisite with you to your lab of choice and have them fax results to Dr. Delatorre at [phone].    Your provider ordered an xray, called a KUB, you may walk into any  Radiology Facility to have this performed or you may schedule at [phone]

## 2025-04-24 NOTE — PROGRESS NOTES
Patient is a 59 y.o. male presenting as a new patient with PMH of kidney stones, and erectile dysfunction.    SUBJECTIVE:  HPI   He presents as a new patient, referred by Amor Vital MD for PMH of kidney stones.   He has had recent back pain.  He was a prior patient of Dr. Quintana who suggested an OTC medication to help dissolve the stones.  The patient states that it feels that the medication is helping to decrease his stones.    He reports recent burning with urination that may have been a stone passes.     He states his ejaculate volume can occasional seem decreased. He takes antidepressant medication.     He has history of erectile dysfunction, treated with sildenafil 100 mg.    He has history of hyperparathyroid.  PTH in 2024 was 36.     He has history of kidney stones, treated with ureteroscopy in 2019    A calculi analysis from 12/28/22 showed stone composed of 60% calcium oxalate monohydrate, 30% calcium oxalate dihydrate    Medical History[1]  Surgical History[2]     Review of Systems   Constitutional: denies any unintentional weight loss or change in strength.  Integumentary: denies any rashes or pruritus.  Eyes: denies any double vision or eye pain.  Ear/Nose/Mouth/Throat: denies any nosebleeds or gum bleeds.  Cardiovascular: denies any chest pain or syncope.  Respiratory: denies hemoptysis.  Gastrointestinal: denies nausea or vomiting.  Musculoskeletal: denies muscle cramping or weakness.  Neurologic: denies convulsions or seizures.  Hematologic/Lymphatic: denies bleeding tendencies.  Endocrine: denies heat/cold intolerance.  All other systems have been reviewed and are negative unless otherwise noted in the HPI.    OBJECTIVE:  Visit Vitals  Temp 36.5 °C (97.7 °F)     Physical Exam   Constitutional: No obvious distress.  Eyes: Non-injected conjunctiva, sclera clear, EOMI.  Ears/Nose/Mouth/Throat: No obvious drainage per ears or nose.  Cardiovascular: Extremities are warm and well perfused. No  edema, cyanosis or pallor.  Respiratory: No audible wheezing/stridor; respirations do not appear labored.  Gastrointestinal: Abdomen soft, not distended.  Musculoskeletal: Normal ROM of extremities.  Skin: No obvious rashes or open sores.  Neurologic: Alert and oriented, CN 2-12 grossly intact.  Psychiatric: Answers questions appropriately with normal affect.  Hematologic/Lymphatic/Immunologic: No obvious bruises or sites of spontaneous bleeding.  Genitourinary: No CVA tenderness, bladder not palpable. No scrotal masses or tenderness. SILVIO: prostate is smooth without nodules or tenderness.    LABS:  Lab Results   Component Value Date    WBC 8.5 10/25/2024    HGB 16.8 10/25/2024    HCT 47.8 10/25/2024    MCV 94 10/25/2024     10/25/2024    GLUCOSE 88 10/25/2024    CALCIUM 9.5 10/25/2024     10/25/2024    K 4.0 10/25/2024    CO2 26 10/25/2024     10/25/2024    BUN 18 10/25/2024    CREATININE 0.90 10/25/2024    EGFR >90 10/25/2024    PTH 36.7 10/03/2023    URICACID 5.7 10/03/2023    PSA 1.06 10/25/2024     IMAGING:      PROCEDURES:  PVR 0 mL  4/24/25    ASSESSMENT/PLAN:  Problem List Items Addressed This Visit       Calculus of kidney - Primary    Relevant Orders    Measure post void residual (Completed)    POCT UA Automated manually resulted (Completed)    XR abdomen 1 view     Other Visit Diagnoses         Microscopic hematuria        Relevant Orders    Urine Culture    Microscopic Only, Urine      Elevated bilirubin        Relevant Orders    Hepatic function panel      Erectile dysfunction, unspecified erectile dysfunction type               He has history of kidney stones, treated with ureteroscopy in 2019. He will repeat KUB    He has history of erectile dysfunction, treated with sildenafil 100 mg. Renewal for sildenafil provided today.    He is followed for prostate cancer screening. SILVIO is negative today.   PSA summary  10/25/24 1.06  04/23/24 1.64    UA had moderate bilirubin, 1+ ketones,  2+protein, and small blood and will be sent for microscopy, culture, and sensitivity.  LFTs in 2023 were normal. He will repeat hepatic panel    He will follow up in 1 year.     All questions were answered to the patient’s satisfaction.  Patient agrees with the plan and wishes to proceed.  Follow-up will be scheduled appropriately.     Scribe Attestation  By signing my name below, I, GiselaJohn Atkins,   attest that this documentation has been prepared under the direction and in the presence of Demar Delatorre MD.         [1]   Past Medical History:  Diagnosis Date    Anxiety     Depression     Headache     Kidney stone     Migraine     Parasomnia     Personal history of other diseases of the digestive system     History of gastroesophageal reflux (GERD)    Personal history of other diseases of the nervous system and sense organs     History of obstructive sleep apnea    Personal history of other endocrine, nutritional and metabolic disease     History of thyroid disorder    Restless leg syndrome     Sleep apnea, obstructive     Sleep walking disorder     Snoring     Strabismus    [2]   Past Surgical History:  Procedure Laterality Date    BACK SURGERY      CIRCUMCISION, PRIMARY      CYSTOSCOPY      LITHOTRIPSY      OTHER SURGICAL HISTORY  01/04/2022    Parathyroid surgery    OTHER SURGICAL HISTORY  01/04/2022    Back surgery    WISDOM TOOTH EXTRACTION

## 2025-04-25 RX ORDER — SILDENAFIL 100 MG/1
100 TABLET, FILM COATED ORAL DAILY PRN
Qty: 15 TABLET | Refills: 5 | Status: SHIPPED | OUTPATIENT
Start: 2025-04-25

## 2025-04-26 LAB
BACTERIA #/AREA URNS HPF: ABNORMAL /HPF
BACTERIA UR CULT: NORMAL
CAOX CRY #/AREA URNS HPF: ABNORMAL /HPF
HYALINE CASTS #/AREA URNS LPF: ABNORMAL /LPF
RBC #/AREA URNS HPF: ABNORMAL /HPF
SERVICE CMNT-IMP: ABNORMAL
SQUAMOUS #/AREA URNS HPF: ABNORMAL /HPF
WBC #/AREA URNS HPF: ABNORMAL /HPF

## 2025-04-27 PROBLEM — R31.29 MICROSCOPIC HEMATURIA: Status: ACTIVE | Noted: 2025-04-27

## 2025-04-27 PROBLEM — N52.8 OTHER MALE ERECTILE DYSFUNCTION: Status: ACTIVE | Noted: 2025-04-27

## 2025-04-28 DIAGNOSIS — G43.009 MIGRAINE WITHOUT AURA AND WITHOUT STATUS MIGRAINOSUS, NOT INTRACTABLE: ICD-10-CM

## 2025-05-02 ENCOUNTER — TELEPHONE (OUTPATIENT)
Dept: UROLOGY | Facility: CLINIC | Age: 60
End: 2025-05-02
Payer: COMMERCIAL

## 2025-05-02 DIAGNOSIS — R31.29 MICROSCOPIC HEMATURIA: ICD-10-CM

## 2025-05-05 ENCOUNTER — TELEPHONE (OUTPATIENT)
Dept: UROLOGY | Facility: CLINIC | Age: 60
End: 2025-05-05
Payer: COMMERCIAL

## 2025-05-05 DIAGNOSIS — G43.009 MIGRAINE WITHOUT AURA AND WITHOUT STATUS MIGRAINOSUS, NOT INTRACTABLE: ICD-10-CM

## 2025-05-06 RX ORDER — VERAPAMIL HYDROCHLORIDE 240 MG/1
CAPSULE, DELAYED RELEASE ORAL
Qty: 90 CAPSULE | Refills: 3 | Status: SHIPPED | OUTPATIENT
Start: 2025-05-06

## 2025-05-07 DIAGNOSIS — F33.41 RECURRENT MAJOR DEPRESSIVE DISORDER, IN PARTIAL REMISSION (CMS-HCC): Primary | ICD-10-CM

## 2025-05-07 DIAGNOSIS — G43.009 MIGRAINE WITHOUT AURA AND WITHOUT STATUS MIGRAINOSUS, NOT INTRACTABLE: ICD-10-CM

## 2025-05-13 LAB
ALBUMIN SERPL-MCNC: 4.5 G/DL (ref 3.6–5.1)
ALBUMIN/GLOB SERPL: 1.9 (CALC) (ref 1–2.5)
ALP SERPL-CCNC: 66 U/L (ref 35–144)
ALT SERPL-CCNC: 21 U/L (ref 9–46)
ANION GAP SERPL CALCULATED.4IONS-SCNC: 15 MMOL/L (CALC) (ref 7–17)
AST SERPL-CCNC: 18 U/L (ref 10–35)
BILIRUB DIRECT SERPL-MCNC: 0.2 MG/DL
BILIRUB INDIRECT SERPL-MCNC: 0.5 MG/DL (CALC) (ref 0.2–1.2)
BILIRUB SERPL-MCNC: 0.7 MG/DL (ref 0.2–1.2)
BUN SERPL-MCNC: 17 MG/DL (ref 7–25)
BUN/CREAT SERPL: 12 (CALC) (ref 6–22)
CALCIUM SERPL-MCNC: 9.7 MG/DL (ref 8.6–10.3)
CHLORIDE SERPL-SCNC: 102 MMOL/L (ref 98–110)
CO2 SERPL-SCNC: 25 MMOL/L (ref 20–32)
CREAT SERPL-MCNC: 1.43 MG/DL (ref 0.7–1.35)
EGFRCR SERPLBLD CKD-EPI 2021: 56 ML/MIN/1.73M2
GLOBULIN SER CALC-MCNC: 2.4 G/DL (CALC) (ref 1.9–3.7)
GLUCOSE SERPL-MCNC: 79 MG/DL (ref 65–99)
POTASSIUM SERPL-SCNC: 4 MMOL/L (ref 3.5–5.3)
PROT SERPL-MCNC: 6.9 G/DL (ref 6.1–8.1)
SODIUM SERPL-SCNC: 142 MMOL/L (ref 135–146)

## 2025-05-15 DIAGNOSIS — F32.A DEPRESSION, UNSPECIFIED DEPRESSION TYPE: ICD-10-CM

## 2025-05-15 DIAGNOSIS — F41.9 ANXIETY: ICD-10-CM

## 2025-05-15 DIAGNOSIS — F33.41 RECURRENT MAJOR DEPRESSIVE DISORDER, IN PARTIAL REMISSION: ICD-10-CM

## 2025-05-16 ENCOUNTER — APPOINTMENT (OUTPATIENT)
Dept: RADIOLOGY | Facility: HOSPITAL | Age: 60
End: 2025-05-16
Payer: COMMERCIAL

## 2025-05-16 ENCOUNTER — TELEPHONE (OUTPATIENT)
Dept: UROLOGY | Facility: CLINIC | Age: 60
End: 2025-05-16

## 2025-05-19 ENCOUNTER — TELEPHONE (OUTPATIENT)
Dept: UROLOGY | Facility: CLINIC | Age: 60
End: 2025-05-19
Payer: COMMERCIAL

## 2025-05-19 DIAGNOSIS — F41.9 ANXIETY: ICD-10-CM

## 2025-05-19 DIAGNOSIS — F33.41 RECURRENT MAJOR DEPRESSIVE DISORDER, IN PARTIAL REMISSION: ICD-10-CM

## 2025-05-19 DIAGNOSIS — F32.A DEPRESSION, UNSPECIFIED DEPRESSION TYPE: ICD-10-CM

## 2025-05-19 DIAGNOSIS — R31.29 MICROSCOPIC HEMATURIA: ICD-10-CM

## 2025-05-19 RX ORDER — BUPROPION HYDROCHLORIDE 100 MG/1
TABLET, EXTENDED RELEASE ORAL
Refills: 0 | OUTPATIENT
Start: 2025-05-19

## 2025-05-19 RX ORDER — BUPROPION HYDROCHLORIDE 100 MG/1
100 TABLET, EXTENDED RELEASE ORAL 2 TIMES DAILY
Qty: 180 TABLET | Refills: 3 | Status: SHIPPED | OUTPATIENT
Start: 2025-05-19 | End: 2026-05-19

## 2025-05-22 ENCOUNTER — TELEPHONE (OUTPATIENT)
Dept: UROLOGY | Facility: CLINIC | Age: 60
End: 2025-05-22
Payer: COMMERCIAL

## 2025-05-22 NOTE — TELEPHONE ENCOUNTER
Wife calling patient has appt on 7/3 @ 1:00 but he can't do that time he wants after 3:00.  I see Dr. Delatorre schedule is booked I told her you were out and that you can call her back to see if ok to change the time till later or reschedule

## 2025-06-10 ENCOUNTER — HOSPITAL ENCOUNTER (OUTPATIENT)
Dept: RADIOLOGY | Facility: HOSPITAL | Age: 60
Discharge: HOME | End: 2025-06-10
Payer: COMMERCIAL

## 2025-06-10 DIAGNOSIS — R31.29 MICROSCOPIC HEMATURIA: ICD-10-CM

## 2025-06-10 DIAGNOSIS — N20.0 CALCULUS OF KIDNEY: ICD-10-CM

## 2025-06-10 PROCEDURE — 74178 CT ABD&PLV WO CNTR FLWD CNTR: CPT | Performed by: RADIOLOGY

## 2025-06-10 PROCEDURE — 76376 3D RENDER W/INTRP POSTPROCES: CPT | Performed by: RADIOLOGY

## 2025-06-10 PROCEDURE — 2550000001 HC RX 255 CONTRASTS: Performed by: UROLOGY

## 2025-06-10 PROCEDURE — 74018 RADEX ABDOMEN 1 VIEW: CPT | Performed by: RADIOLOGY

## 2025-06-10 PROCEDURE — 76377 3D RENDER W/INTRP POSTPROCES: CPT

## 2025-06-10 PROCEDURE — 74018 RADEX ABDOMEN 1 VIEW: CPT

## 2025-06-10 RX ADMIN — IOHEXOL 75 ML: 350 INJECTION, SOLUTION INTRAVENOUS at 15:51

## 2025-06-11 ENCOUNTER — APPOINTMENT (OUTPATIENT)
Dept: PRIMARY CARE | Facility: CLINIC | Age: 60
End: 2025-06-11
Payer: COMMERCIAL

## 2025-06-11 VITALS
HEART RATE: 90 BPM | SYSTOLIC BLOOD PRESSURE: 136 MMHG | WEIGHT: 270 LBS | TEMPERATURE: 97.1 F | BODY MASS INDEX: 35.62 KG/M2 | DIASTOLIC BLOOD PRESSURE: 89 MMHG

## 2025-06-11 DIAGNOSIS — F33.41 RECURRENT MAJOR DEPRESSIVE DISORDER, IN PARTIAL REMISSION: ICD-10-CM

## 2025-06-11 DIAGNOSIS — J30.9 ALLERGIC SINUSITIS: ICD-10-CM

## 2025-06-11 DIAGNOSIS — G43.009 MIGRAINE WITHOUT AURA AND WITHOUT STATUS MIGRAINOSUS, NOT INTRACTABLE: Primary | ICD-10-CM

## 2025-06-11 PROCEDURE — 1036F TOBACCO NON-USER: CPT | Performed by: INTERNAL MEDICINE

## 2025-06-11 PROCEDURE — 99214 OFFICE O/P EST MOD 30 MIN: CPT | Performed by: INTERNAL MEDICINE

## 2025-06-11 RX ORDER — SUMATRIPTAN 5 MG/1
1 SPRAY NASAL EVERY 2 HOUR PRN
Qty: 3 EACH | Refills: 3 | Status: SHIPPED | OUTPATIENT
Start: 2025-06-11

## 2025-06-11 RX ORDER — DULOXETIN HYDROCHLORIDE 30 MG/1
30 CAPSULE, DELAYED RELEASE ORAL DAILY
Qty: 90 CAPSULE | Refills: 1 | Status: SHIPPED | OUTPATIENT
Start: 2025-06-11 | End: 2025-12-08

## 2025-06-11 ASSESSMENT — ENCOUNTER SYMPTOMS
FEVER: 0
COUGH: 0
CHILLS: 0
SHORTNESS OF BREATH: 0
POLYDIPSIA: 0
PALPITATIONS: 0

## 2025-06-11 ASSESSMENT — PAIN SCALES - GENERAL: PAINLEVEL_OUTOF10: 6

## 2025-06-11 NOTE — ASSESSMENT & PLAN NOTE
Orders:    Referral to Clinical Pharmacy; Future    SUMAtriptan (Imitrex) 5 mg/actuation nasal spray; Administer 1 spray (5 mg) into one nostril every 2 hours if needed for migraine.

## 2025-06-11 NOTE — PROGRESS NOTES
Subjective   Patient ID: Bakari Delarosa is a 59 y.o. male who presents for Medication Visit, Follow-up, and Migraine.    Chronic episodic migraine disorder, dx 35 years ago. Current migraine frequency is 4-5 per month. Currently using Sumatriptan and High Dose Nsaids for migraine treatment prn at first sign of migraine do no ablate the migraine. Addition of Excedrin dose not stop migraine. Sumatriptan is higher dose 100mg tablets. Has not previously tried nasal spray or shot. But Triptan and Nsaid use combined is not effective at stopping migraines. We trial Ubrlevy and it was effective, sometimes uses the full triple therapy, but alone it is better then the other meds in combo.   No known side effects of Triptans.   Insurance refusing coverage of ubrelvy. Did get drug card from me previously and was on the medication through that previously.   Current frequency is 4-5 per month, worse now in the Spring.     Patient does note clinical improvement in his motivation and drive on the new doses of medication after adjusting his medication.  I believe this is predominantly his Cymbalta based on its dual action on serotonin norepinephrine.  I am advising for titration for hopefully greater control.  We will reevaluate again in the overall control of this condition in approximately 2 or more so months.    Migraine   Pertinent negatives include no coughing or fever.        Review of Systems   Constitutional:  Negative for chills and fever.   Respiratory:  Negative for cough and shortness of breath.    Cardiovascular:  Negative for chest pain and palpitations.   Endocrine: Negative for polydipsia and polyuria.       Objective   /89 (BP Location: Left arm, Patient Position: Sitting, BP Cuff Size: Adult)   Pulse 90   Temp 36.2 °C (97.1 °F) (Temporal)   Wt 122 kg (270 lb)   BMI 35.62 kg/m²     Physical Exam  Constitutional:       Appearance: Normal appearance.   HENT:      Head: Normocephalic and atraumatic.   Eyes:       Extraocular Movements: Extraocular movements intact.      Pupils: Pupils are equal, round, and reactive to light.   Neck:      Thyroid: No thyroid mass or thyromegaly.   Cardiovascular:      Rate and Rhythm: Normal rate and regular rhythm.      Heart sounds: No murmur heard.     No friction rub. No gallop.   Pulmonary:      Effort: No respiratory distress.      Breath sounds: No wheezing, rhonchi or rales.   Musculoskeletal:      Cervical back: Neck supple.      Right lower leg: No edema.      Left lower leg: No edema.   Neurological:      Mental Status: He is alert.         Assessment/Plan   Assessment & Plan  Migraine without aura and without status migrainosus, not intractable    Orders:    Referral to Clinical Pharmacy; Future    SUMAtriptan (Imitrex) 5 mg/actuation nasal spray; Administer 1 spray (5 mg) into one nostril every 2 hours if needed for migraine.    Allergic sinusitis         Recurrent major depressive disorder, in partial remission    Orders:    DULoxetine (Cymbalta) 30 mg DR capsule; Take 1 capsule (30 mg) by mouth once daily. Total dose 90mg

## 2025-06-18 ENCOUNTER — APPOINTMENT (OUTPATIENT)
Dept: SLEEP MEDICINE | Facility: CLINIC | Age: 60
End: 2025-06-18
Payer: COMMERCIAL

## 2025-06-18 VITALS
SYSTOLIC BLOOD PRESSURE: 102 MMHG | HEIGHT: 73 IN | HEART RATE: 87 BPM | BODY MASS INDEX: 35.12 KG/M2 | WEIGHT: 265 LBS | DIASTOLIC BLOOD PRESSURE: 76 MMHG | OXYGEN SATURATION: 96 %

## 2025-06-18 DIAGNOSIS — G47.61 PERIODIC LIMB MOVEMENT DISORDER (PLMD): ICD-10-CM

## 2025-06-18 DIAGNOSIS — G47.33 OBSTRUCTIVE SLEEP APNEA SYNDROME: ICD-10-CM

## 2025-06-18 DIAGNOSIS — F51.5 NIGHTMARE DISORDER: ICD-10-CM

## 2025-06-18 DIAGNOSIS — G47.52 REM SLEEP BEHAVIOR DISORDER: Primary | ICD-10-CM

## 2025-06-18 PROCEDURE — 3008F BODY MASS INDEX DOCD: CPT | Performed by: INTERNAL MEDICINE

## 2025-06-18 PROCEDURE — 99215 OFFICE O/P EST HI 40 MIN: CPT | Performed by: INTERNAL MEDICINE

## 2025-06-18 RX ORDER — TRAZODONE HYDROCHLORIDE 50 MG/1
50 TABLET ORAL NIGHTLY
Qty: 30 TABLET | Refills: 0 | Status: SHIPPED | OUTPATIENT
Start: 2025-06-18 | End: 2025-07-18

## 2025-06-18 ASSESSMENT — SLEEP AND FATIGUE QUESTIONNAIRES
ESS-CHAD TOTAL SCORE: 9
SLEEP_PROBLEM_INTERFERES_DAILY_ACTIVITIES: SOMEWHAT
SLEEP_PROBLEM_NOTICEABLE_TO_OTHERS: SOMEWHAT
WAKING_TOO_EARLY: MODERATE
HOW LIKELY ARE YOU TO NOD OFF OR FALL ASLEEP WHEN YOU ARE A PASSENGER IN A CAR FOR AN HOUR WITHOUT A BREAK: WOULD NEVER DOZE
DIFFICULTY_STAYING_ASLEEP: MODERATE
HOW LIKELY ARE YOU TO NOD OFF OR FALL ASLEEP WHILE SITTING QUIETLY AFTER LUNCH WITHOUT ALCOHOL: SLIGHT CHANCE OF DOZING
DIFFICULTY_FALLING_ASLEEP: MODERATE
HOW LIKELY ARE YOU TO NOD OFF OR FALL ASLEEP WHILE WATCHING TV: MODERATE CHANCE OF DOZING
HOW LIKELY ARE YOU TO NOD OFF OR FALL ASLEEP IN A CAR, WHILE STOPPED FOR A FEW MINUTES IN TRAFFIC: WOULD NEVER DOZE
SITING INACTIVE IN A PUBLIC PLACE LIKE A CLASS ROOM OR A MOVIE THEATER: WOULD NEVER DOZE
HOW LIKELY ARE YOU TO NOD OFF OR FALL ASLEEP WHILE SITTING AND TALKING TO SOMEONE: WOULD NEVER DOZE
HOW LIKELY ARE YOU TO NOD OFF OR FALL ASLEEP WHILE SITTING AND READING: HIGH CHANCE OF DOZING
WORRIED_DISTRESSED_DUE_TO_SLEEP: A LITTLE
SATISFACTION_WITH_CURRENT_SLEEP_PATTERN: DISSATISFIED
HOW LIKELY ARE YOU TO NOD OFF OR FALL ASLEEP WHILE LYING DOWN TO REST IN THE AFTERNOON WHEN CIRCUMSTANCES PERMIT: HIGH CHANCE OF DOZING

## 2025-06-18 ASSESSMENT — PAIN SCALES - GENERAL: PAINLEVEL_OUTOF10: 0-NO PAIN

## 2025-06-18 NOTE — ASSESSMENT & PLAN NOTE
Bakari   has sleep apnea and requires treatment.  Bakari reports that his equipment is not working properly.  Bakari 's current CPAP is broken beyond repair.  Motor has exceeded life expectancy  Bakari demonstrates previous good compliance and benefit from PAP therapy   Bakari is a REPAP and needs a replacement with remote monitoring capabilities.  Order Auto-PAP therapy through [employer], and bring him back for 31-90 day compliance

## 2025-06-18 NOTE — LETTER
2025     Amor Vital MD  [address] [facility] [address]    Patient: Bakari Delarosa   YOB: 1965   Date of Visit: 2025       Dear Dr. Amor Vital MD:    Thank you for referring Bakari Delarosa to me for evaluation. Below are my notes for this consultation.  If you have questions, please do not hesitate to call me. I look forward to following your patient along with you.       Sincerely,     Ruperto Orellana MD      CC: No Recipients  ______________________________________________________________________________________         Patient: Bakari Delarosa    [id number]  : 1965 -- AGE 59 y.o.    Provider: Ruperto Orellana MD     Location [facility]   Service Date: 2025              [facility]  Followup Visit Note    Subjective  Patient ID: Bakari Delarosa is a 59 y.o. male who presents for Rem Sleep Behavior Disorder, Sleep Apnea, and Pap Adherence Followup.  HPI    Prior Sleep History:      Current Sleep History:  Bakari presents for RBD, RIMA, PLM-D and excessive daytime sleepiness  He had the cymbalta and wellbutrin adjusted for his depression.   He was prescribed prazosin by his PCP for nightmare disorder  He continues to have dream enactment behavior.  RBD did not tolerate clonazepam, or temazepam, melatonin ineffective. Stopped clonazepam due to too much sedation without significant impact on his RBD symptoms    Bedtime 7 PM  Falls asleep time: 10 - 15 minutes    Wakes up 4:30 - 5 AM  He will try to go back to sleep in the chair    He will nap between 11 AM - 1 PM and will nap for up to 1.5- 3 hours.  The patient's current CPAP is broken beyond repair. Bakari Delarosa needs a replacement with remote monitoring capabilities.   A downloaded compliance report was reviewed and was interpreted by myself as follows:  > 4 hour compliance was 70 %, with an average use of 5 hours with a residual AHI 1.7 .     Edward  "MELLISA Delarosa reports good benefit from his device.   ESS: 9   Insomnia Severity Index  1. Difficulty falling asleep: Moderate  2. Difficulty staying asleep: Moderate  3. Problems waking up too early: Moderate  4. How SATISFIED/DISSATISFIED are you with your CURRENT sleep pattern?: Dissatisfied  5. How NOTICEABLE to others do you think your sleep problem is in terms of impairing the quality of your life?: Somewhat  6. How WORRIED/DISTRESSED are you about your current sleep problem?: A Little  7. To what extent do you consider your sleep problem to INTERFERE with your daily functioning (e.g. daytime fatigue, mood, ability to function at work/daily chores, concentration, memory, mood, etc.) CURRENTLY?: Somewhat  ROSETTE TS: 0-7 = No clinically significant insomnia 8-14 = Subthreshold insomnia 15-21 = Clinical insomnia (moderate severity) 22-28 = Clinical insomnia (severe): 14   FOSQ: 28    Review of Systems  Review of systems negative except as per HPI  Objective  /76   Pulse 87   Ht 1.854 m (6' 1\")   Wt 120 kg (265 lb)   SpO2 96%   BMI 34.96 kg/m²    PREVIOUS WEIGHTS:  Wt Readings from Last 3 Encounters:   06/18/25 120 kg (265 lb)   06/11/25 122 kg (270 lb)   03/17/25 120 kg (265 lb)     Physical Exam  PHYSICAL EXAM: GENERAL: alert pleasant and cooperative no acute distress  PSYCH EXAM: alert,oriented, in NAD with a full range of affect, normal behavior and no psychotic features    Lab Results   Component Value Date    IRON 157 10/03/2023    TIBC 380 10/03/2023    FERRITIN 137 07/12/2023        Assessment/Plan  Problem List Items Addressed This Visit           ICD-10-CM    Obstructive sleep apnea syndrome G47.33    Bakari   has sleep apnea and requires treatment.  Bakari reports that his equipment is not working properly.  Bakari 's current CPAP is broken beyond repair.  Motor has exceeded life expectancy  Bakari demonstrates previous good compliance and benefit from PAP therapy   Bakari is a REPAP and needs a " replacement with remote monitoring capabilities.  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 He does not have nightmare disorder, he has REM behavior disorder. It is ineffective for this  Trial trazodone for RBD, due to case studies. He did not tolerate clonazepam, did not respond to melatonin. Did discuss that we are now dealing with a very challenging case and will need to trial medications that are off label. I did go into detail about risk of serotonin syndrome with the addition of trazodone, considering he is already on Cymbalta and takes a triptan PRN. Handout provided. We will try ramelteon next for RBD.  I did emphasize the need for CPAP compliance.  If an SSRI will be considered other than cymbalta, then paroxetine may be a good option, some case studies reported reduction in RBD symtpoms with paroxetine. Did discuss considering carbamazepine, can also consider imipramine.          Relevant Medications    traZODone (Desyrel) 50 mg tablet    Periodic limb movement disorder (PLMD) G47.61    He has tried ropinirole, made worse, gabapentin 300 mg, he stopped taking it. Consider gabapentin encarbil, can consider lyrica or buprenorphine for refractory RLS/PLMD, which I suspect is contributing to the excessive daytime sleepiness         RESOLVED: Nightmare disorder F51.5

## 2025-06-18 NOTE — ASSESSMENT & PLAN NOTE
Stopping prazosin. He does not have nightmare disorder, he has REM behavior disorder. It is ineffective for this  Trial trazodone for RBD, due to case studies. He did not tolerate clonazepam, did not respond to melatonin. Did discuss that we are now dealing with a very challenging case and will need to trial medications that are off label. I did go into detail about risk of serotonin syndrome with the addition of trazodone, considering he is already on Cymbalta and takes a triptan PRN. Handout provided. We will try ramelteon next for RBD.  I did emphasize the need for CPAP compliance.  If an SSRI will be considered other than cymbalta, then paroxetine may be a good option, some case studies reported reduction in RBD symtpoms with paroxetine. Did discuss considering carbamazepine, can also consider imipramine.   Follow-up in 4 weeks

## 2025-06-18 NOTE — ASSESSMENT & PLAN NOTE
He has tried ropinirole, made worse, gabapentin 300 mg, he stopped taking it. Consider gabapentin encarbil, can consider lyrica or buprenorphine for refractory RLS/PLMD, which I suspect is contributing to the excessive daytime sleepiness

## 2025-06-18 NOTE — PROGRESS NOTES
Patient: Bakari Delarosa    [id number]  : 1965 -- AGE 59 y.o.    Provider: Ruperto Orellana MD     Location [facility]   Service Date: 2025              [facility]  Followup Visit Note    Subjective   Patient ID: Bakari Delarosa is a 59 y.o. male who presents for Rem Sleep Behavior Disorder, Sleep Apnea, and Pap Adherence Followup.  HPI    Prior Sleep History:      Current Sleep History:  Bakari presents for RBD, RIMA, PLM-D and excessive daytime sleepiness  He had the cymbalta and wellbutrin adjusted for his depression.   He was prescribed prazosin by his PCP for nightmare disorder  He continues to have dream enactment behavior.  RBD did not tolerate clonazepam, or temazepam, melatonin ineffective. Stopped clonazepam due to too much sedation without significant impact on his RBD symptoms    Bedtime 7 PM  Falls asleep time: 10 - 15 minutes    Wakes up 4:30 - 5 AM  He will try to go back to sleep in the chair    He will nap between 11 AM - 1 PM and will nap for up to 1.5- 3 hours.  The patient's current CPAP is broken beyond repair. Bakari Delarosa needs a replacement with remote monitoring capabilities.   A downloaded compliance report was reviewed and was interpreted by myself as follows:  > 4 hour compliance was 70 %, with an average use of 5 hours with a residual AHI 1.7 .     Bakari Delarosa reports good benefit from his device.   ESS: 9   Insomnia Severity Index  1. Difficulty falling asleep: Moderate  2. Difficulty staying asleep: Moderate  3. Problems waking up too early: Moderate  4. How SATISFIED/DISSATISFIED are you with your CURRENT sleep pattern?: Dissatisfied  5. How NOTICEABLE to others do you think your sleep problem is in terms of impairing the quality of your life?: Somewhat  6. How WORRIED/DISTRESSED are you about your current sleep problem?: A Little  7. To what extent do you consider your sleep problem to INTERFERE with your  "daily functioning (e.g. daytime fatigue, mood, ability to function at work/daily chores, concentration, memory, mood, etc.) CURRENTLY?: Somewhat  ROSETTE TS: 0-7 = No clinically significant insomnia 8-14 = Subthreshold insomnia 15-21 = Clinical insomnia (moderate severity) 22-28 = Clinical insomnia (severe): 14   FOSQ: 28    Review of Systems  Review of systems negative except as per HPI  Objective   /76   Pulse 87   Ht 1.854 m (6' 1\")   Wt 120 kg (265 lb)   SpO2 96%   BMI 34.96 kg/m²    PREVIOUS WEIGHTS:  Wt Readings from Last 3 Encounters:   06/18/25 120 kg (265 lb)   06/11/25 122 kg (270 lb)   03/17/25 120 kg (265 lb)     Physical Exam  PHYSICAL EXAM: GENERAL: alert pleasant and cooperative no acute distress  PSYCH EXAM: alert,oriented, in NAD with a full range of affect, normal behavior and no psychotic features    Lab Results   Component Value Date    IRON 157 10/03/2023    TIBC 380 10/03/2023    FERRITIN 137 07/12/2023        Assessment/Plan   Problem List Items Addressed This Visit           ICD-10-CM    Obstructive sleep apnea syndrome G47.33    Bakari   has sleep apnea and requires treatment.  Bakari reports that his equipment is not working properly.  Bakari 's current CPAP is broken beyond repair.  Motor has exceeded life expectancy  Bakari demonstrates previous good compliance and benefit from PAP therapy   Bakari is a REPAP and needs a replacement with remote monitoring capabilities.  Order Auto-PAP therapy through [employer], and bring him back for 31-90 day compliance         Relevant Orders    Positive Airway Pressure (PAP) Therapy    REM sleep behavior disorder - Primary G47.52    Stopping prazosin. He does not have nightmare disorder, he has REM behavior disorder. It is ineffective for this  Trial trazodone for RBD, due to case studies. He did not tolerate clonazepam, did not respond to melatonin. Did discuss that we are now dealing with a very challenging case and will need to " trial medications that are off label. I did go into detail about risk of serotonin syndrome with the addition of trazodone, considering he is already on Cymbalta and takes a triptan PRN. Handout provided. We will try ramelteon next for RBD.  I did emphasize the need for CPAP compliance.  If an SSRI will be considered other than cymbalta, then paroxetine may be a good option, some case studies reported reduction in RBD symtpoms with paroxetine. Did discuss considering carbamazepine, can also consider imipramine.   Follow-up in 4 weeks         Relevant Medications    traZODone (Desyrel) 50 mg tablet    Periodic limb movement disorder (PLMD) G47.61    He has tried ropinirole, made worse, gabapentin 300 mg, he stopped taking it. Consider gabapentin encarbil, can consider lyrica or buprenorphine for refractory RLS/PLMD, which I suspect is contributing to the excessive daytime sleepiness         RESOLVED: Nightmare disorder F51.5

## 2025-06-23 ENCOUNTER — TELEMEDICINE (OUTPATIENT)
Dept: UROLOGY | Facility: CLINIC | Age: 60
End: 2025-06-23
Payer: COMMERCIAL

## 2025-06-23 DIAGNOSIS — R31.29 MICROSCOPIC HEMATURIA: ICD-10-CM

## 2025-06-23 DIAGNOSIS — N20.0 LEFT RENAL STONE: ICD-10-CM

## 2025-06-23 DIAGNOSIS — N20.0 CALCULUS OF KIDNEY: Primary | ICD-10-CM

## 2025-06-23 PROCEDURE — 99213 OFFICE O/P EST LOW 20 MIN: CPT | Performed by: UROLOGY

## 2025-06-23 PROCEDURE — 1036F TOBACCO NON-USER: CPT | Performed by: UROLOGY

## 2025-06-23 NOTE — PROGRESS NOTES
Patient is a 59 y.o. male presenting for followup with [facility] of kidney stones.    SUBJECTIVE:  HPI   He presents for followup.  He denies recent abdominal or flank pain.   He states he has vacation planned on  until .    He has history of erectile dysfunction, treated with sildenafil 100 mg. He takes antidepressant medication.   He has history of hyperparathyroid. PTH in  was 36.     He has history of kidney stones, treated with ureteroscopy in 2019  A calculi analysis from 22 showed stone composed of 60% calcium oxalate monohydrate, 30% calcium oxalate dihydrate    Medical History[1]  Surgical History[2]     Review of Systems   All systems have been reviewed and are negative unless otherwise noted in the HPI.    OBJECTIVE:  There were no vitals taken for this visit.    Physical Exam   Deferred due to Telehealth video visit.    LABS:  Lab Results   Component Value Date    WBC 8.5 10/25/2024    HGB 16.8 10/25/2024    HCT 47.8 10/25/2024    MCV 94 10/25/2024     10/25/2024    GLUCOSE 79 2025    CALCIUM 9.7 2025     2025    K 4.0 2025    CO2 25 2025     2025    BUN 17 2025    CREATININE 1.43 (H) 2025    EGFR 56 (L) 2025    PTH 36.7 10/03/2023    URICACID 5.7 10/03/2023    PSA 1.06 10/25/2024    URINECULTURE SEE NOTE 2025     Lab Results   Component Value Date    ALT 21 2025    AST 18 2025    ALKPHOS 66 2025    BILITOT 0.7 2025     24-hour urinalysis: 10/22/24  Cystine screenin2017    IMAGING:  === 06/10/25 ===  XR ABDOMEN 1 VIEW  - Impression -  Calcifications over the kidneys bilaterally, consistent with renal  calculi.    === 06/10/25 ===  CT UROGRAPHY WITH 3D VOLUME RENDERED IMAGING  - Impression -  1.  A few nonobstructing calculi in each kidney measuring up to 10 mm  on the left. A few tiny cysts, otherwise unremarkable kidneys and  ureters without suspicious lesion,  hydronephrosis, or obstructing  calculus.  2. No acute findings.    PROCEDURES:  PVR 0 mL  4/24/25    ASSESSMENT/PLAN:  Problem List Items Addressed This Visit       Calculus of kidney - Primary    Microscopic hematuria    Relevant Orders    Case Request Operating Room: LITHOTRIPSY, ESWL, CYSTOSCOPY, FLEXIBLE (Completed)    Left renal stone    Relevant Orders    Case Request Operating Room: LITHOTRIPSY, ESWL, CYSTOSCOPY, FLEXIBLE (Completed)     He has history of kidney stones, treated with ureteroscopy in 2019. KUB and CT urogram (6/10/25) were viewed and identified bilateral non-obstructing kidney stones, up to 1 cm on the left side.    We discussed treatment of the stone with ESWL. Risks include infection, bleeding, injury and bleed from the kidney, obstruction from fragments, and need for further procedures were reviewed.  He has elected to be scheduled.    He voiced understanding to hold NSAID's for 1 week and anticoagulants for 3 days prior to his procedure.     He has history of hyperparathyroid. PTH in 2024 was 36.     He is followed for prostate cancer screening. SILVIO is negative today.   PSA summary  10/25/24 1.06  04/23/24 1.64    He has history of erectile dysfunction, treated with sildenafil 100 mg.     He has history of microscopic hematuria. Urinalysis identified 3-10 RBC/HPF in 4/25. He will be evaluated with cystoscopy at the time of his stone treatment.    UA had moderate bilirubin, 1+ ketones, 2+protein, and small blood previously.  LFT's were normal in 5/25.     All questions were answered to the patient’s satisfaction.  Patient agrees with the plan and wishes to proceed.  Follow-up will be scheduled appropriately.     Virtual or Telephone Consent    An interactive audio and video telecommunication system which permits real time communications between the patient (at the originating site) and provider (at the distant site) was utilized to provide this telehealth service.   Verbal consent was  requested and obtained from Bakari Delarosa on this date, 06/23/25 for a telehealth visit and the patient's location was confirmed at the time of the visit.    Scribe Attestation  By signing my name below, I, John Garza,   attest that this documentation has been prepared under the direction and in the presence of Demar Delatorre MD.         [1]   Past Medical History:  Diagnosis Date    Anxiety     Depression     Headache     Kidney stone     Migraine     Parasomnia     Personal history of other diseases of the digestive system     History of gastroesophageal reflux (GERD)    Personal history of other diseases of the nervous system and sense organs     History of obstructive sleep apnea    Personal history of other endocrine, nutritional and metabolic disease     History of thyroid disorder    Restless leg syndrome     Sleep apnea, obstructive     Sleep walking disorder     Snoring     Strabismus    [2]   Past Surgical History:  Procedure Laterality Date    BACK SURGERY      CIRCUMCISION, PRIMARY      CYSTOSCOPY      LITHOTRIPSY      OTHER SURGICAL HISTORY  01/04/2022    Parathyroid surgery    OTHER SURGICAL HISTORY  01/04/2022    Back surgery    WISDOM TOOTH EXTRACTION

## 2025-06-25 DIAGNOSIS — F33.41 RECURRENT MAJOR DEPRESSIVE DISORDER, IN PARTIAL REMISSION: ICD-10-CM

## 2025-06-25 RX ORDER — DULOXETIN HYDROCHLORIDE 30 MG/1
30 CAPSULE, DELAYED RELEASE ORAL DAILY
Qty: 90 CAPSULE | Refills: 1 | Status: SHIPPED | OUTPATIENT
Start: 2025-06-25 | End: 2025-12-22

## 2025-07-01 ENCOUNTER — PRE-ADMISSION TESTING (OUTPATIENT)
Dept: PREADMISSION TESTING | Facility: HOSPITAL | Age: 60
End: 2025-07-01
Payer: COMMERCIAL

## 2025-07-01 VITALS
HEART RATE: 106 BPM | RESPIRATION RATE: 18 BRPM | TEMPERATURE: 96.6 F | WEIGHT: 268.96 LBS | HEIGHT: 73 IN | SYSTOLIC BLOOD PRESSURE: 148 MMHG | OXYGEN SATURATION: 95 % | DIASTOLIC BLOOD PRESSURE: 95 MMHG | BODY MASS INDEX: 35.65 KG/M2

## 2025-07-01 DIAGNOSIS — Z01.818 PRE-OP EXAMINATION: Primary | ICD-10-CM

## 2025-07-01 PROCEDURE — 93005 ELECTROCARDIOGRAM TRACING: CPT

## 2025-07-01 PROCEDURE — 99204 OFFICE O/P NEW MOD 45 MIN: CPT

## 2025-07-01 ASSESSMENT — DUKE ACTIVITY SCORE INDEX (DASI)
CAN YOU HAVE SEXUAL RELATIONS: YES
CAN YOU CLIMB A FLIGHT OF STAIRS OR WALK UP A HILL: YES
CAN YOU TAKE CARE OF YOURSELF (EAT, DRESS, BATHE, OR USE TOILET): YES
CAN YOU WALK INDOORS, SUCH AS AROUND YOUR HOUSE: YES
DASI METS SCORE: 9.9
CAN YOU DO LIGHT WORK AROUND THE HOUSE LIKE DUSTING OR WASHING DISHES: YES
CAN YOU DO MODERATE WORK AROUND THE HOUSE LIKE VACUUMING, SWEEPING FLOORS OR CARRYING GROCERIES: YES
CAN YOU DO YARD WORK LIKE RAKING LEAVES, WEEDING OR PUSHING A MOWER: YES
CAN YOU RUN A SHORT DISTANCE: YES
CAN YOU PARTICIPATE IN STRENOUS SPORTS LIKE SWIMMING, SINGLES TENNIS, FOOTBALL, BASKETBALL, OR SKIING: YES
CAN YOU WALK A BLOCK OR TWO ON LEVEL GROUND: YES
CAN YOU DO HEAVY WORK AROUND THE HOUSE LIKE SCRUBBING FLOORS OR LIFTING AND MOVING HEAVY FURNITURE: YES
CAN YOU PARTICIPATE IN MODERATE RECREATIONAL ACTIVITIES LIKE GOLF, BOWLING, DANCING, DOUBLES TENNIS OR THROWING A BASEBALL OR FOOTBALL: YES
TOTAL_SCORE: 58.2

## 2025-07-01 ASSESSMENT — ENCOUNTER SYMPTOMS
ENDOCRINE NEGATIVE: 1
GASTROINTESTINAL NEGATIVE: 1
FLANK PAIN: 1
NEUROLOGICAL NEGATIVE: 1
RESPIRATORY NEGATIVE: 1
HEMATURIA: 1
BACK PAIN: 1
HEMATOLOGIC/LYMPHATIC NEGATIVE: 1
ALLERGIC/IMMUNOLOGIC NEGATIVE: 1
EYES NEGATIVE: 1
PSYCHIATRIC NEGATIVE: 1
CONSTITUTIONAL NEGATIVE: 1
CARDIOVASCULAR NEGATIVE: 1

## 2025-07-01 ASSESSMENT — PAIN - FUNCTIONAL ASSESSMENT: PAIN_FUNCTIONAL_ASSESSMENT: 0-10

## 2025-07-01 ASSESSMENT — PAIN SCALES - GENERAL: PAINLEVEL_OUTOF10: 0 - NO PAIN

## 2025-07-01 NOTE — CPM/PAT H&P
CPM/PAT Evaluation       Name: Bakari Delarosa (Bakari Delarosa)  /Age: 1965/59 y.o.     In-Person       Chief Complaint: Kidney stone    HPI: Bakari Delarosa is a 59 year old male with a history of kidney stones S/p ureteroscopy in 2019. He states he had 2 other lithotripsy procedures in the past. He states he was having some occasional back pain.  CT urography shows:  IMPRESSION:  1.  A few nonobstructing calculi in each kidney measuring up to 10 mm on the left. A few tiny cysts, otherwise unremarkable kidneys and  ureters without suspicious lesion, hydronephrosis, or obstructing  calculus.  2. No acute findings.    Patient states he was told he had blood in his UA sample. He denies seeing blood , or having painful urination. He is scheduled for a left ESWL. He denies fever. chills, nausea, vomiting, chest pain, SOB, and dizziness.     Medical History[1]    Surgical History[2]    Social History     Tobacco Use    Smoking status: Former     Current packs/day: 0.00     Average packs/day: 1 pack/day for 33.0 years (33.0 ttl pk-yrs)     Types: Cigarettes     Start date: 1980     Quit date: 2013     Years since quittin.5    Smokeless tobacco: Former     Quit date: 1998   Substance Use Topics    Alcohol use: Yes     Alcohol/week: 5.0 standard drinks of alcohol     Types: 5 Cans of beer per week     Comment: Socially     Social History     Substance and Sexual Activity   Drug Use Never       Family History[3]    Allergies[4]  Current Outpatient Medications   Medication Sig Dispense Refill    allopurinol (Zyloprim) 100 mg tablet TAKE 1 TABLET DAILY (TOTAL DOSE 400 MG) 90 tablet 3    allopurinol (Zyloprim) 300 mg tablet TAKE 1 TABLET DAILY 90 tablet 3    atorvastatin (Lipitor) 40 mg tablet TAKE 1 TABLET DAILY 90 tablet 3    buPROPion SR (Wellbutrin SR) 100 mg 12 hr tablet Take 1 tablet (100 mg) by mouth 2 times a day. Do not crush, chew, or split. 180 tablet 3    chlorthalidone (Hygroton) 25 mg  tablet Take 1 tablet (25 mg) by mouth once daily. 90 tablet 3    cholecalciferol (Vitamin D-3) 25 MCG (1000 UT) tablet Take by mouth.      DULoxetine (Cymbalta) 30 mg DR capsule Take 1 capsule (30 mg) by mouth once daily. Total dose 90mg 90 capsule 1    DULoxetine (Cymbalta) 60 mg DR capsule Take 1 capsule (60 mg) by mouth once daily. 90 capsule 3    magnesium chloride (MagDelay) 64 mg EC tablet Take 1 tablet (64 mg) by mouth once daily. 90 tablet 3    potassium chloride CR (Klor-Con M20) 20 mEq ER tablet Take 1 tablet (20 mEq) by mouth once daily. Do not crush or chew. 90 tablet 3    sildenafil (Viagra) 100 mg tablet Take 1 tablet (100 mg) by mouth once daily as needed for erectile dysfunction. 15 tablet 5    SUMAtriptan (Imitrex) 100 mg tablet TAKE 1 TABLET ONE TIME IF NEEDED FOR MIGRAINE FOR UP  DOSES. 9 tablet 27    SUMAtriptan (Imitrex) 5 mg/actuation nasal spray Administer 1 spray (5 mg) into one nostril every 2 hours if needed for migraine. 3 each 3    traZODone (Desyrel) 50 mg tablet Take 1 tablet (50 mg) by mouth once daily at bedtime. 30 tablet 0    verapamil ER (Veralan PM) 240 mg 24 hr capsule TAKE 1 CAPSULE DAILY AT BEDTIME (DO NOT CRUSH OR CHEW) 90 capsule 3    zinc gluconate 50 mg tablet Take by mouth once daily.      fluticasone (Flonase) 50 mcg/actuation nasal spray Administer 1 spray into each nostril once daily. Shake gently. Before first use, prime pump. After use, clean tip and replace cap. (Patient not taking: Reported on 7/1/2025) 16 g 0    rimegepant (Nurtec ODT) 75 mg tablet,disintegrating Take every other day prn migraine (Patient not taking: Reported on 7/1/2025) 15 tablet 5     No current facility-administered medications for this visit.       Review of Systems   Constitutional: Negative.    HENT: Negative.     Eyes: Negative.    Respiratory: Negative.     Cardiovascular: Negative.    Gastrointestinal: Negative.    Endocrine: Negative.    Genitourinary:  Positive for flank pain and  "hematuria.   Musculoskeletal:  Positive for back pain.   Skin: Negative.    Allergic/Immunologic: Negative.    Neurological: Negative.    Hematological: Negative.    Psychiatric/Behavioral: Negative.                Physical Exam  Vitals reviewed.   Constitutional:       Appearance: Normal appearance.   HENT:      Head: Normocephalic and atraumatic.      Nose: Nose normal.      Mouth/Throat:      Mouth: Mucous membranes are moist.      Pharynx: Oropharynx is clear.   Eyes:      Extraocular Movements: Extraocular movements intact.      Conjunctiva/sclera: Conjunctivae normal.   Cardiovascular:      Rate and Rhythm: Normal rate and regular rhythm.      Pulses: Normal pulses.      Heart sounds: Normal heart sounds.   Pulmonary:      Effort: Pulmonary effort is normal.      Breath sounds: Normal breath sounds.   Abdominal:      General: Bowel sounds are normal.      Palpations: Abdomen is soft.   Genitourinary:     Comments: Assessment deferred to physician  Musculoskeletal:         General: Normal range of motion.      Cervical back: Normal range of motion and neck supple.   Skin:     General: Skin is warm and dry.   Neurological:      General: No focal deficit present.      Mental Status: He is alert and oriented to person, place, and time.   Psychiatric:         Mood and Affect: Mood normal.         Behavior: Behavior normal.         Thought Content: Thought content normal.         Judgment: Judgment normal.          PAT AIRWAY:   Airway:     Mallampati::  I    TM distance::  >3 FB    Neck ROM::  Full  normal          Visit Vitals  BP (!) 148/95   Pulse 106   Temp 35.9 °C (96.6 °F) (Temporal)   Resp 18   Ht 1.854 m (6' 1\")   Wt 122 kg (268 lb 15.4 oz)   SpO2 95%   BMI 35.49 kg/m²   Smoking Status Former   BSA 2.51 m²     ASA: II  CHADS2: 1.9%  RCRI: 0.4%  DASI: 58.2  METS:9.9  STOP BAN        Assessment and Plan:     Left renal stone , Microscopic hematuria : allopurinol, LITHOTRIPSY, ESWL , CYSTOSCOPY, FLEXIBLE "   Hyperlipidemia: Atorvastatin  Hypertension: Chlorthalidone, Verapamil  Migraine: Sumatriptan  Anxiety/depression: Wellbutrin, Cymbalta  RIMA: C-PAP  Hx of hyperparathyroid: s/p parathyroid removal  BMI: 35.49    LABS: 5/12/25  EKG done in [facility]           [1]   Past Medical History:  Diagnosis Date    Anxiety 2000    COVID-19 2020    CPAP (continuous positive airway pressure) dependence 2019    Depression 2000    Headache     Hyperlipidemia 2010    Kidney stone     Migraine     Parasomnia     Parathyroid disease (Multi) 2019    Personal history of other diseases of the digestive system     History of gastroesophageal reflux (GERD)    Personal history of other diseases of the nervous system and sense organs     History of obstructive sleep apnea    Personal history of other endocrine, nutritional and metabolic disease     History of thyroid disorder    Restless leg syndrome     Sleep apnea, obstructive     Sleep walking disorder     Snoring     Strabismus    [2]   Past Surgical History:  Procedure Laterality Date    BACK SURGERY      CIRCUMCISION, PRIMARY      CYSTOSCOPY      LITHOTRIPSY      OTHER SURGICAL HISTORY  01/04/2022    Parathyroid surgery    OTHER SURGICAL HISTORY  01/04/2022    Back surgery    WISDOM TOOTH EXTRACTION     [3]   Family History  Problem Relation Name Age of Onset    Colon polyps Mother      Prostate cancer Father Edward P.     Stroke Maternal Grandmother gg     Glaucoma Maternal Grandfather Unruly Ezzo     Prostate cancer Maternal Grandfather Unruly Ezzo     Cancer Maternal Grandfather Unruly Ezzo     Colon cancer Maternal Grandfather Unruly Ezzo     Breast cancer Paternal Grandmother grandma H     Lung cancer Paternal Grandmother grandma H     Glaucoma Other grandfather    [4]   Allergies  Allergen Reactions    Pramipexole Other     Exacerbation of RBD    Respirol Dizziness    Ropinirole Hcl Dizziness

## 2025-07-01 NOTE — PREPROCEDURE INSTRUCTIONS
Medication List            Accurate as of July 1, 2025  3:59 PM. Always use your most recent med list.                * allopurinol 300 mg tablet  Commonly known as: Zyloprim  TAKE 1 TABLET DAILY  Medication Adjustments for Surgery: Take/Use as prescribed     * allopurinol 100 mg tablet  Commonly known as: Zyloprim  TAKE 1 TABLET DAILY (TOTAL DOSE 400 MG)  Medication Adjustments for Surgery: Take/Use as prescribed     atorvastatin 40 mg tablet  Commonly known as: Lipitor  TAKE 1 TABLET DAILY  Medication Adjustments for Surgery: Take/Use as prescribed     buPROPion  mg 12 hr tablet  Commonly known as: Wellbutrin SR  Take 1 tablet (100 mg) by mouth 2 times a day. Do not crush, chew, or split.  Medication Adjustments for Surgery: Take on the morning of surgery     chlorthalidone 25 mg tablet  Commonly known as: Hygroton  Take 1 tablet (25 mg) by mouth once daily.  Medication Adjustments for Surgery: Take/Use as prescribed     cholecalciferol 25 mcg (1,000 units) tablet  Commonly known as: Vitamin D-3  Additional Medication Adjustments for Surgery: Take last dose 7 days before surgery     * DULoxetine 60 mg DR capsule  Commonly known as: Cymbalta  Take 1 capsule (60 mg) by mouth once daily.  Medication Adjustments for Surgery: Take/Use as prescribed     * DULoxetine 30 mg DR capsule  Commonly known as: Cymbalta  Take 1 capsule (30 mg) by mouth once daily. Total dose 90mg  Medication Adjustments for Surgery: Take/Use as prescribed        magnesium chloride 535 mg (64 mg elemental) EC tablet  Commonly known as: MagDelay  Take 1 tablet (64 mg) by mouth once daily.  Additional Medication Adjustments for Surgery: Take last dose 7 days before surgery     potassium chloride CR 20 mEq ER tablet  Commonly known as: Klor-Con M20  Take 1 tablet (20 mEq) by mouth once daily. Do not crush or chew.  Additional Medication Adjustments for Surgery: Take last dose 7 days before surgery        sildenafil 100 mg tablet  Commonly  known as: Viagra  Take 1 tablet (100 mg) by mouth once daily as needed for erectile dysfunction.  Medication Adjustments for Surgery: Take last dose 3 days before surgery     SUMAtriptan 100 mg tablet  Commonly known as: Imitrex  TAKE 1 TABLET ONE TIME IF NEEDED FOR MIGRAINE FOR UP  DOSES.  Medication Adjustments for Surgery: Do Not take on the morning of surgery     SUMAtriptan 5 mg/actuation nasal spray  Commonly known as: Imitrex  Administer 1 spray (5 mg) into one nostril every 2 hours if needed for migraine.  Medication Adjustments for Surgery: Do Not take on the morning of surgery     traZODone 50 mg tablet  Commonly known as: Desyrel  Take 1 tablet (50 mg) by mouth once daily at bedtime.  Medication Adjustments for Surgery: Take/Use as prescribed     verapamil  mg 24 hr capsule  Commonly known as: Veralan PM  TAKE 1 CAPSULE DAILY AT BEDTIME (DO NOT CRUSH OR CHEW)  Medication Adjustments for Surgery: Take/Use as prescribed     zinc gluconate 50 mg elemental tablet  Additional Medication Adjustments for Surgery: Take last dose 7 days before surgery           * This list has 4 medication(s) that are the same as other medications prescribed for you. Read the directions carefully, and ask your doctor or other care provider to review them with you.                         Preoperative Fasting Guidelines    Why must I stop eating and drinking near surgery time?  With sedation, food or liquid in your stomach can enter your lungs causing serious complications  Increases nausea and vomiting    When do I need to stop eating and drinking before my surgery?  Do not eat any food after midnight the night before your surgery/procedure.  You may have up to 13.5 ounces of clear liquid until TWO hours before your instructed arrival time to the hospital.  This includes water, black tea/coffee, (no milk or cream) apple juice, and electrolyte drinks (Gatorade)  You may chew gum until TWO hours before your  surgery/procedure    PAT DISCHARGE INSTRUCTIONS    The Same Day Surgery (SDS) Department of the hospital where your procedure will be performed will contact you after 2:00 PM the day before your surgery. If you are scheduled on a Monday, or a Tuesday following a Monday holiday, they will call on the last business day prior to your surgery.  Please check your voicemail for any missed messages.    [facility]  [address] [place] [address] [phone]  Second Floor      [facility]  [address] [place] [address], [address] [phone]  Second Floor  *PLEASE CALL ABOVE NUMBER FOR ARRIVAL TIME       [facility]  [address] [place] [address] [phone]    Please let your surgeon know if:      You develop any open sores, shingles, burning or painful urination as these may increase your risk of an infection.   You no longer wish to have the surgery.   Any other personal circumstances change that may lead to the need to cancel or defer this surgery-such as being sick or getting admitted to any hospital within one week of your planned procedure.    Your contact details change, such as a change of address or phone number.    Starting now:     Please DO NOT drink alcohol or smoke for 24 hours before surgery. It is well known that quitting smoking can make a huge difference to your health and recovery from surgery. The longer you abstain from smoking, the better your chances of a healthy recovery. If you need help with quitting, call 2-936-QUIT-NOW to be connected to a trained counselor who will discuss the best methods to help you quit.     Before your surgery:    Please stop all supplements 7 days prior to surgery. Or as directed by your surgeon.   Please stop taking NSAID pain medicine such as Advil and Motrin 7 days before surgery. - TYLENOL IS OK   If you develop any fever, cough, cold, rashes,  cuts, scratches, scrapes, urinary symptoms or infection anywhere on your body (including teeth and gums) prior to surgery, please call your surgeon’s office as soon as possible. This may require treatment to reduce the chance of cancellation on the day of surgery.    The day before your surgery:   DIET- Please follow the diet instructions at the top of your packet.   Get a good night’s rest.  Use the special soap for bathing if you have been instructed to use one.    Scheduled surgery times may change and you will be notified if this occurs - please check your personal voicemail for any updates.     On the morning of surgery:   Wear comfortable, loose fitting clothes which open in the front. Please do not wear moisturizers, creams, lotions, makeup or perfume.    Please bring with you to surgery:   Photo ID and insurance card   Current list of medicines and allergies   Pacemaker/ Defibrillator/Heart stent cards   CPAP machine and mask    Slings/ splints/ crutches   A copy of your complete advanced directive/DHPOA.    Please do NOT bring with you to surgery:   All jewelry and valuables should be left at home.   Prosthetic devices such as contact lenses, hearing aids, dentures, eyelash extensions, hairpins and body piercings must be removed prior to going in to the surgical suite.    After outpatient surgery:   A responsible adult MUST accompany you at the time of discharge and stay with you for 24 hours after your surgery. You may NOT drive yourself home after surgery.    Do not drive, operate machinery, make critical decisions or do activities that require co-ordination or balance until after a night’s sleep.   Do not drink alcoholic beverages for 24 hours.   Instructions for resuming your medications will be provided by your surgeon.    CALL YOUR DOCTOR AFTER SURGERY IF YOU HAVE:     Chills and/or a fever of 101° F or higher.    Redness, swelling, pus or drainage from your surgical wound or a bad smell from the  wound.    Lightheadedness, fainting or confusion.    Persistent vomiting (throwing up) and are not able to eat or drink for 12 hours.    Three or more loose, watery bowel movements in 24 hours (diarrhea).   Difficulty or pain while urinating( after non-urological surgery)    Pain and swelling in your legs, especially if it is only on one side.    Difficulty breathing or are breathing faster than normal.    Any new concerning symptoms.

## 2025-07-02 ENCOUNTER — APPOINTMENT (OUTPATIENT)
Dept: PREADMISSION TESTING | Facility: HOSPITAL | Age: 60
End: 2025-07-02
Payer: COMMERCIAL

## 2025-07-02 LAB
ATRIAL RATE: 102 BPM
P AXIS: 43 DEGREES
P OFFSET: 188 MS
P ONSET: 132 MS
PR INTERVAL: 158 MS
Q ONSET: 211 MS
QRS COUNT: 17 BEATS
QRS DURATION: 102 MS
QT INTERVAL: 338 MS
QTC CALCULATION(BAZETT): 440 MS
QTC FREDERICIA: 403 MS
R AXIS: 34 DEGREES
T AXIS: 55 DEGREES
T OFFSET: 380 MS
VENTRICULAR RATE: 102 BPM

## 2025-07-03 ENCOUNTER — APPOINTMENT (OUTPATIENT)
Dept: UROLOGY | Facility: CLINIC | Age: 60
End: 2025-07-03
Payer: COMMERCIAL

## 2025-07-09 ENCOUNTER — HOSPITAL ENCOUNTER (OUTPATIENT)
Facility: HOSPITAL | Age: 60
Setting detail: OUTPATIENT SURGERY
Discharge: HOME | End: 2025-07-09
Attending: UROLOGY | Admitting: UROLOGY
Payer: COMMERCIAL

## 2025-07-09 ENCOUNTER — ANESTHESIA (OUTPATIENT)
Dept: OPERATING ROOM | Facility: HOSPITAL | Age: 60
End: 2025-07-09
Payer: COMMERCIAL

## 2025-07-09 ENCOUNTER — ANESTHESIA EVENT (OUTPATIENT)
Dept: OPERATING ROOM | Facility: HOSPITAL | Age: 60
End: 2025-07-09
Payer: COMMERCIAL

## 2025-07-09 VITALS
WEIGHT: 264.4 LBS | RESPIRATION RATE: 16 BRPM | BODY MASS INDEX: 35.04 KG/M2 | HEIGHT: 73 IN | SYSTOLIC BLOOD PRESSURE: 148 MMHG | DIASTOLIC BLOOD PRESSURE: 88 MMHG | OXYGEN SATURATION: 98 % | TEMPERATURE: 97.7 F | HEART RATE: 87 BPM

## 2025-07-09 DIAGNOSIS — N20.0 LEFT RENAL STONE: Primary | ICD-10-CM

## 2025-07-09 DIAGNOSIS — R31.29 MICROSCOPIC HEMATURIA: ICD-10-CM

## 2025-07-09 PROBLEM — G47.33 OSA (OBSTRUCTIVE SLEEP APNEA): Status: ACTIVE | Noted: 2025-07-09

## 2025-07-09 PROBLEM — K21.9 GASTROESOPHAGEAL REFLUX DISEASE: Status: ACTIVE | Noted: 2025-07-09

## 2025-07-09 PROBLEM — Z86.69 HISTORY OF MIGRAINE HEADACHES: Status: ACTIVE | Noted: 2025-07-09

## 2025-07-09 PROCEDURE — 7100000002 HC RECOVERY ROOM TIME - EACH INCREMENTAL 1 MINUTE: Performed by: UROLOGY

## 2025-07-09 PROCEDURE — 3600000003 HC OR TIME - INITIAL BASE CHARGE - PROCEDURE LEVEL THREE: Performed by: UROLOGY

## 2025-07-09 PROCEDURE — 2720000007 HC OR 272 NO HCPCS: Performed by: UROLOGY

## 2025-07-09 PROCEDURE — 3700000001 HC GENERAL ANESTHESIA TIME - INITIAL BASE CHARGE: Performed by: UROLOGY

## 2025-07-09 PROCEDURE — 3700000002 HC GENERAL ANESTHESIA TIME - EACH INCREMENTAL 1 MINUTE: Performed by: UROLOGY

## 2025-07-09 PROCEDURE — 7100000010 HC PHASE TWO TIME - EACH INCREMENTAL 1 MINUTE: Performed by: UROLOGY

## 2025-07-09 PROCEDURE — 3600000008 HC OR TIME - EACH INCREMENTAL 1 MINUTE - PROCEDURE LEVEL THREE: Performed by: UROLOGY

## 2025-07-09 PROCEDURE — 2500000004 HC RX 250 GENERAL PHARMACY W/ HCPCS (ALT 636 FOR OP/ED): Performed by: INTERNAL MEDICINE

## 2025-07-09 PROCEDURE — 52000 CYSTOURETHROSCOPY: CPT | Performed by: UROLOGY

## 2025-07-09 PROCEDURE — 7100000009 HC PHASE TWO TIME - INITIAL BASE CHARGE: Performed by: UROLOGY

## 2025-07-09 PROCEDURE — 7100000001 HC RECOVERY ROOM TIME - INITIAL BASE CHARGE: Performed by: UROLOGY

## 2025-07-09 PROCEDURE — 50590 FRAGMENTING OF KIDNEY STONE: CPT | Performed by: UROLOGY

## 2025-07-09 RX ORDER — CEFAZOLIN 1 G/1
INJECTION, POWDER, FOR SOLUTION INTRAVENOUS AS NEEDED
Status: DISCONTINUED | OUTPATIENT
Start: 2025-07-09 | End: 2025-07-09

## 2025-07-09 RX ORDER — PROPOFOL 10 MG/ML
INJECTION, EMULSION INTRAVENOUS AS NEEDED
Status: DISCONTINUED | OUTPATIENT
Start: 2025-07-09 | End: 2025-07-09

## 2025-07-09 RX ORDER — ACETAMINOPHEN 500 MG
1000 TABLET ORAL EVERY 6 HOURS PRN
COMMUNITY

## 2025-07-09 RX ORDER — LIDOCAINE HYDROCHLORIDE 10 MG/ML
0.1 INJECTION, SOLUTION EPIDURAL; INFILTRATION; INTRACAUDAL; PERINEURAL ONCE
Status: DISCONTINUED | OUTPATIENT
Start: 2025-07-09 | End: 2025-07-09 | Stop reason: HOSPADM

## 2025-07-09 RX ORDER — ONDANSETRON HYDROCHLORIDE 2 MG/ML
INJECTION, SOLUTION INTRAVENOUS AS NEEDED
Status: DISCONTINUED | OUTPATIENT
Start: 2025-07-09 | End: 2025-07-09

## 2025-07-09 RX ORDER — FENTANYL CITRATE 50 UG/ML
50 INJECTION, SOLUTION INTRAMUSCULAR; INTRAVENOUS EVERY 5 MIN PRN
Status: DISCONTINUED | OUTPATIENT
Start: 2025-07-09 | End: 2025-07-09 | Stop reason: HOSPADM

## 2025-07-09 RX ORDER — LABETALOL HYDROCHLORIDE 5 MG/ML
5 INJECTION, SOLUTION INTRAVENOUS ONCE AS NEEDED
Status: DISCONTINUED | OUTPATIENT
Start: 2025-07-09 | End: 2025-07-09 | Stop reason: HOSPADM

## 2025-07-09 RX ORDER — FENTANYL CITRATE 50 UG/ML
25 INJECTION, SOLUTION INTRAMUSCULAR; INTRAVENOUS EVERY 5 MIN PRN
Status: DISCONTINUED | OUTPATIENT
Start: 2025-07-09 | End: 2025-07-09 | Stop reason: HOSPADM

## 2025-07-09 RX ORDER — IBUPROFEN 600 MG/1
600 TABLET, FILM COATED ORAL EVERY 6 HOURS PRN
COMMUNITY

## 2025-07-09 RX ORDER — FENTANYL CITRATE 50 UG/ML
INJECTION, SOLUTION INTRAMUSCULAR; INTRAVENOUS AS NEEDED
Status: DISCONTINUED | OUTPATIENT
Start: 2025-07-09 | End: 2025-07-09

## 2025-07-09 RX ORDER — OXYCODONE HYDROCHLORIDE 5 MG/1
5 TABLET ORAL EVERY 4 HOURS PRN
Status: DISCONTINUED | OUTPATIENT
Start: 2025-07-09 | End: 2025-07-09 | Stop reason: HOSPADM

## 2025-07-09 RX ORDER — MEPERIDINE HYDROCHLORIDE 25 MG/ML
12.5 INJECTION INTRAMUSCULAR; INTRAVENOUS; SUBCUTANEOUS EVERY 10 MIN PRN
Status: DISCONTINUED | OUTPATIENT
Start: 2025-07-09 | End: 2025-07-09 | Stop reason: HOSPADM

## 2025-07-09 RX ORDER — ONDANSETRON HYDROCHLORIDE 2 MG/ML
4 INJECTION, SOLUTION INTRAVENOUS ONCE AS NEEDED
Status: DISCONTINUED | OUTPATIENT
Start: 2025-07-09 | End: 2025-07-09 | Stop reason: HOSPADM

## 2025-07-09 RX ORDER — CEFAZOLIN SODIUM IN 0.9 % NACL 3 G/100 ML
3 INTRAVENOUS SOLUTION, PIGGYBACK (ML) INTRAVENOUS ONCE
Status: DISCONTINUED | OUTPATIENT
Start: 2025-07-09 | End: 2025-07-09 | Stop reason: HOSPADM

## 2025-07-09 RX ADMIN — SODIUM CHLORIDE, POTASSIUM CHLORIDE, SODIUM LACTATE AND CALCIUM CHLORIDE: 600; 310; 30; 20 INJECTION, SOLUTION INTRAVENOUS at 11:28

## 2025-07-09 RX ADMIN — FENTANYL CITRATE 100 MCG: 50 INJECTION, SOLUTION INTRAMUSCULAR; INTRAVENOUS at 11:47

## 2025-07-09 RX ADMIN — FENTANYL CITRATE 50 MCG: 50 INJECTION, SOLUTION INTRAMUSCULAR; INTRAVENOUS at 11:44

## 2025-07-09 RX ADMIN — ONDANSETRON 4 MG: 2 INJECTION, SOLUTION INTRAMUSCULAR; INTRAVENOUS at 11:59

## 2025-07-09 RX ADMIN — FENTANYL CITRATE 50 MCG: 50 INJECTION, SOLUTION INTRAMUSCULAR; INTRAVENOUS at 11:42

## 2025-07-09 RX ADMIN — PROPOFOL 200 MG: 10 INJECTION, EMULSION INTRAVENOUS at 11:38

## 2025-07-09 RX ADMIN — CEFAZOLIN 2 G: 330 INJECTION, POWDER, FOR SOLUTION INTRAMUSCULAR; INTRAVENOUS at 11:43

## 2025-07-09 ASSESSMENT — PAIN - FUNCTIONAL ASSESSMENT
PAIN_FUNCTIONAL_ASSESSMENT: 0-10

## 2025-07-09 ASSESSMENT — PAIN SCALES - GENERAL
PAINLEVEL_OUTOF10: 0 - NO PAIN

## 2025-07-09 ASSESSMENT — COLUMBIA-SUICIDE SEVERITY RATING SCALE - C-SSRS
1. IN THE PAST MONTH, HAVE YOU WISHED YOU WERE DEAD OR WISHED YOU COULD GO TO SLEEP AND NOT WAKE UP?: NO
6. HAVE YOU EVER DONE ANYTHING, STARTED TO DO ANYTHING, OR PREPARED TO DO ANYTHING TO END YOUR LIFE?: NO
2. HAVE YOU ACTUALLY HAD ANY THOUGHTS OF KILLING YOURSELF?: NO

## 2025-07-09 NOTE — OP NOTE
LITHOTRIPSY, ESWL (L), CYSTOSCOPY, FLEXIBLE ( ESWL REP YASIR FORTUNE ) Operative Note     Date: 2025  OR Location: [facility]    Name: Bakari Delarosa, : 1965, Age: 59 y.o., MRN: [id number], Sex: male    Diagnosis  Pre-op Diagnosis      * Left renal stone [N20.0]     * Microscopic hematuria [R31.29] Post-op Diagnosis     * Left renal stone [N20.0]     * Microscopic hematuria [R31.29]     Procedures  LITHOTRIPSY, ESWL  80195 - [place] LITHOTRIPSY XTRCORP SHOCK WAVE    CYSTOSCOPY, FLEXIBLE ( ESWL REP YASIR FORTUNE )  01632 - [place] CYSTOURETHROSCOPY      Surgeons      * Demar Delatorre - Primary    Resident/Fellow/Other Assistant:  Surgeons and Role:  * No surgeons found with a matching role *    Staff:   Relief Circulator: Katie Altamirano Person: Ale  Circulator: Esha    Anesthesia Staff: Anesthesiologist: Isaak Mcdaniels MD  CRNA: Sachin Sanders, APRN-CNP, APRN-CRNA    Procedure Summary  Anesthesia: General  ASA: II  Estimated Blood Loss: 0 mL  Intra-op Medications:   Administrations occurring from 1130 to 1238 on 25:   Medication Name Total Dose   ceFAZolin (Ancef) vial 1 g 2 g   fentaNYL (Sublimaze) injection 50 mcg/mL 200 mcg   LR bolus Cannot be calculated   ondansetron 2 mg/mL 4 mg   propofol (Diprivan) injection 10 mg/mL 200 mg              Anesthesia Record               Intraprocedure I/O Totals          Intake    LR bolus 600.00 mL    Total Intake 600 mL          Specimen: No specimens collected                Drains and/or Catheters: None    Findings: Cystoscopy negative, stone treated.    Indications: Bakari Delarosa is an 59 y.o. male who is having surgery for Left renal stone [N20.0]  Microscopic hematuria [R31.29]. He presents for left ESWL and cystoscopy.  Risks including infection bleeding,injury to the kidney, need for further procedures, obstruction from fragments were reviewed.  Consent was obtained.    Procedure Details: He was taken to the operating room and given general  anesthesia. He was prepped and draped. Flexible cystoscopy was performed and no tumors or stones were identified.  Enlarged lateral lobes present.     Xray was used to target the large left renal stone.  3000 shocks were used and the stone appeared to soften.  He tolerated the procedure and was transferred to recovery in stable condition.     He will follow up in a few weeks with a KUB.    Complications:  None; patient tolerated the procedure well.              Demar Delatorre  Phone Number: [phone]

## 2025-07-09 NOTE — DISCHARGE INSTRUCTIONS
Follow up in 3 weeks.    Call or go to emergency department with a fever.  You may eat a regular diet  You may shower.  Avoid strenuous activity for 24 hours  Strain urine.    Contact information:  Urology main number: [phone]  Urology : [phone]  Urology nurse: [phone]  Dr. Delatorre Wendell for emergency: [phone]

## 2025-07-09 NOTE — ANESTHESIA PROCEDURE NOTES
Airway  Date/Time: 7/9/2025 11:41 AM  Reason: elective    Airway not difficult    Staffing  Performed: CRNA   Authorized by: Isaak Mcdaniels MD    Performed by: Sachin Sanders, LAWRENCE-CNP, APRN-CRNA  Patient location during procedure: OR    Patient Condition  Indications for airway management: anesthesia  Patient position: sniffing  Sedation level: deep     Final Airway Details   Preoxygenated: yes  Final airway type: supraglottic airway  Successful airway: air-Q  Size: 4  Number of attempts at approach: 1

## 2025-07-09 NOTE — ANESTHESIA PREPROCEDURE EVALUATION
Patient: Bakari Delarosa    Procedure Information       Date/Time: 07/09/25 1130    Procedures:       LITHOTRIPSY, ESWL (Left)      CYSTOSCOPY, FLEXIBLE ( ESWL REP YASIRANA FORTUNE )    Location: [facility] 01 / [facility]    Surgeons: Demar Delatorre MD          Past Medical History:   Diagnosis Date    Anxiety 2000    Cataract 2024    COVID-19 2020    CPAP (continuous positive airway pressure) dependence 2019    Depression 2000    Headache     Hyperlipidemia 2010    Kidney stone     Migraine     Parasomnia     Parathyroid disease (Multi) 2019    Personal history of other diseases of the digestive system     History of gastroesophageal reflux (GERD)    Personal history of other diseases of the nervous system and sense organs     History of obstructive sleep apnea    Personal history of other endocrine, nutritional and metabolic disease     History of thyroid disorder    Restless leg syndrome     Sleep apnea, obstructive     Sleep walking disorder     Snoring     Strabismus         Relevant Problems   Cardiac   (+) Elevated cholesterol      Pulmonary   (+) RIMA (obstructive sleep apnea)      Neuro   (+) History of migraine headaches      GI   (+) Gastroesophageal reflux disease      /Renal   (+) Calculus of kidney   (+) Left renal stone      Endocrine   (+) Primary hyperparathyroidism (Multi)       Clinical information reviewed:                   NPO Detail:  No data recorded     Physical Exam    Airway  Mallampati: II  TM distance: >3 FB  Neck ROM: full     Cardiovascular    Dental    Pulmonary    Abdominal            Anesthesia Plan    History of general anesthesia?: yes  History of complications of general anesthesia?: no    ASA 2     general     intravenous induction   Anesthetic plan and risks discussed with patient.    Plan discussed with CRNA.

## 2025-07-09 NOTE — ANESTHESIA POSTPROCEDURE EVALUATION
Patient: Bakari Delarosa    Procedure Summary       Date: 07/09/25 Room / Location: [facility] / [facility]    Anesthesia Start: 1135 Anesthesia Stop: 1221    Procedures:       LITHOTRIPSY, ESWL (Left)      CYSTOSCOPY, FLEXIBLE ( ESWL REP YASIR FORTUNE ) Diagnosis:       Left renal stone      Microscopic hematuria      (Left renal stone [N20.0])      (Microscopic hematuria [R31.29])    Surgeons: Demar Delatorre MD Responsible Provider: Isaak Mcdaniels MD    Anesthesia Type: general ASA Status: 2            Anesthesia Type: general    Vitals Value Taken Time   /86 07/09/25 12:22   Temp 36.3 07/09/25 12:22   Pulse 88 07/09/25 12:22   Resp 14 07/09/25 12:22   SpO2 98 07/09/25 12:22       Anesthesia Post Evaluation    Patient location during evaluation: PACU  Patient participation: complete - patient participated  Level of consciousness: awake  Pain management: adequate  Airway patency: patent  Cardiovascular status: acceptable  Respiratory status: acceptable  Hydration status: acceptable  Postoperative Nausea and Vomiting: none        There were no known notable events for this encounter.

## 2025-07-10 ENCOUNTER — APPOINTMENT (OUTPATIENT)
Dept: UROLOGY | Facility: CLINIC | Age: 60
End: 2025-07-10
Payer: COMMERCIAL

## 2025-07-10 ENCOUNTER — TELEMEDICINE (OUTPATIENT)
Dept: PHARMACY | Facility: HOSPITAL | Age: 60
End: 2025-07-10
Payer: COMMERCIAL

## 2025-07-10 DIAGNOSIS — Z86.69 HISTORY OF MIGRAINE HEADACHES: Primary | ICD-10-CM

## 2025-07-10 DIAGNOSIS — G43.009 MIGRAINE WITHOUT AURA AND WITHOUT STATUS MIGRAINOSUS, NOT INTRACTABLE: ICD-10-CM

## 2025-07-10 PROCEDURE — RXMED WILLOW AMBULATORY MEDICATION CHARGE

## 2025-07-10 NOTE — PROGRESS NOTES
[place] [address] [facility]  Bakari Delarosa is a 59 y.o. male was referred to Clinical Pharmacy Team for migraine management.    Referring Provider: Amor Vital MD    MEDICATION INITIATION ASSESSMENT    He trialed Ubrelvy in the past with savings card, has since has not had coverage for it.     Recently prescribed sumatriptan nasal spray as alternative to tablet and did  work for the one migraine he used it for.  He does report that the Ubrelvy worked better, especially when using along with sumatriptan     Patient describes migraines as:  Pain slowly come on back of lower left of skull and then migrates up and around to left eye  Accompanied by light and sound sensitivity   Does occasionally/rarely experience nausea and vomiting with it  Aura? No   Has been having migraines for 35 plus years  Frequency is usually weekly, more often in spring and fall  Migraine attacks last for as little as a few hours and up to 3 days or so    CURRENT PHARMACOTHERAPY  - sumatriptan nasal spray- just started a few weeks ago  - sumatriptan tablet     HISTORICAL PHARMACOTHERAPY  - Ubrelvy- very effective, coverage issues    LAB REVIEW  GLUCOSE (mg/dL)   Date Value   05/12/2025 79     Glucose   Date Value   10/25/2024 88 mg/dL   10/03/2023 90 mg/dL   06/13/2022 83 MG/DL   04/17/2021 87 MG/DL   04/17/2021 87 MG/DL     Hemoglobin A1C (%)   Date Value   10/03/2023 5.3     CARBON DIOXIDE (mmol/L)   Date Value   05/12/2025 25     Bicarbonate   Date Value   10/25/2024 26 mmol/L   10/03/2023 25 mmol/L   06/13/2022 26 MMOL/L   04/17/2021 30 MMOL/L   04/17/2021 30 MMOL/L     UREA NITROGEN (BUN) (mg/dL)   Date Value   05/12/2025 17     Urea Nitrogen   Date Value   10/25/2024 18 mg/dL   10/03/2023 13 mg/dL   06/13/2022 17 MG/DL   04/17/2021 12 MG/DL   04/17/2021 12 MG/DL     Creatinine   Date Value   10/25/2024 0.90 mg/dL   10/03/2023 0.90 mg/dL   06/13/2022 0.9 MG/DL   04/17/2021 0.9 MG/DL   04/17/2021 0.9 MG/DL     CREATININE (mg/dL)    Date Value   05/12/2025 1.43 (H)     Lab Results   Component Value Date    CHOL 151 10/03/2023    CHOL 174 06/13/2022    CHOL 145 04/17/2021     Lab Results   Component Value Date    HDL 34.0 (L) 10/03/2023    HDL 34 (L) 06/13/2022    HDL 31 (L) 04/17/2021     Lab Results   Component Value Date    LDLCALC 79 10/03/2023    LDLCALC 90 06/13/2022    LDLCALC 74 04/17/2021     Lab Results   Component Value Date    TRIG 190 (H) 10/03/2023    TRIG 249 (H) 06/13/2022    TRIG 202 (H) 04/17/2021       PATIENT EDUCATION/DISCUSSION:  - Counseled patient on MOA, expectations, side effects, duration of therapy, contraindications, administration, and monitoring parameters  - Answered all patient questions and concerns    PLAN  Submit PA for Ubrelvy   Continue current management    Clinical Pharmacist follow-up: not yet scheduled, call with updates  PCP follow-up: 8/13/25    Thank you,   Susan Dale, PharmD    Verbal consent to manage patient's drug therapy was obtained from the patient . They were informed they may decline to participate or withdraw from participation in pharmacy services at any time.    Continue all meds under the continuation of care with the referring provider and clinical pharmacy team.

## 2025-07-11 ENCOUNTER — PHARMACY VISIT (OUTPATIENT)
Dept: PHARMACY | Facility: CLINIC | Age: 60
End: 2025-07-11
Payer: COMMERCIAL

## 2025-07-16 DIAGNOSIS — G43.009 MIGRAINE WITHOUT AURA AND WITHOUT STATUS MIGRAINOSUS, NOT INTRACTABLE: ICD-10-CM

## 2025-07-16 RX ORDER — LANOLIN ALCOHOL/MO/W.PET/CERES
CREAM (GRAM) TOPICAL DAILY
Qty: 60 TABLET | Refills: 5 | Status: SHIPPED | OUTPATIENT
Start: 2025-07-16

## 2025-07-22 PROCEDURE — RXMED WILLOW AMBULATORY MEDICATION CHARGE

## 2025-07-25 ENCOUNTER — PHARMACY VISIT (OUTPATIENT)
Dept: PHARMACY | Facility: CLINIC | Age: 60
End: 2025-07-25
Payer: COMMERCIAL

## 2025-08-07 ENCOUNTER — APPOINTMENT (OUTPATIENT)
Dept: UROLOGY | Facility: CLINIC | Age: 60
End: 2025-08-07
Payer: COMMERCIAL

## 2025-08-07 ENCOUNTER — APPOINTMENT (OUTPATIENT)
Dept: SLEEP MEDICINE | Facility: CLINIC | Age: 60
End: 2025-08-07
Payer: COMMERCIAL

## 2025-08-07 VITALS
OXYGEN SATURATION: 98 % | SYSTOLIC BLOOD PRESSURE: 108 MMHG | HEIGHT: 73 IN | DIASTOLIC BLOOD PRESSURE: 74 MMHG | BODY MASS INDEX: 35.12 KG/M2 | HEART RATE: 90 BPM | WEIGHT: 265 LBS

## 2025-08-07 DIAGNOSIS — G47.52 REM SLEEP BEHAVIOR DISORDER: ICD-10-CM

## 2025-08-07 DIAGNOSIS — F51.12 BEHAVIORALLY INDUCED INSUFFICIENT SLEEP SYNDROME: ICD-10-CM

## 2025-08-07 DIAGNOSIS — G25.81 RESTLESS LEG SYNDROME: Primary | ICD-10-CM

## 2025-08-07 DIAGNOSIS — G47.33 OBSTRUCTIVE SLEEP APNEA SYNDROME: ICD-10-CM

## 2025-08-07 PROCEDURE — 99214 OFFICE O/P EST MOD 30 MIN: CPT | Performed by: INTERNAL MEDICINE

## 2025-08-07 PROCEDURE — 3008F BODY MASS INDEX DOCD: CPT | Performed by: INTERNAL MEDICINE

## 2025-08-07 ASSESSMENT — LIFESTYLE VARIABLES
AUDIT-C TOTAL SCORE: 2
HOW OFTEN DO YOU HAVE SIX OR MORE DRINKS ON ONE OCCASION: NEVER
SKIP TO QUESTIONS 9-10: 1
HOW MANY STANDARD DRINKS CONTAINING ALCOHOL DO YOU HAVE ON A TYPICAL DAY: PATIENT DOES NOT DRINK
HOW OFTEN DO YOU HAVE A DRINK CONTAINING ALCOHOL: 2-4 TIMES A MONTH

## 2025-08-07 ASSESSMENT — SLEEP AND FATIGUE QUESTIONNAIRES
SATISFACTION_WITH_CURRENT_SLEEP_PATTERN: DISSATISFIED
HOW LIKELY ARE YOU TO NOD OFF OR FALL ASLEEP WHILE LYING DOWN TO REST IN THE AFTERNOON WHEN CIRCUMSTANCES PERMIT: HIGH CHANCE OF DOZING
ESS-CHAD TOTAL SCORE: 9
SITING INACTIVE IN A PUBLIC PLACE LIKE A CLASS ROOM OR A MOVIE THEATER: WOULD NEVER DOZE
DIFFICULTY_STAYING_ASLEEP: MODERATE
SLEEP_PROBLEM_NOTICEABLE_TO_OTHERS: SOMEWHAT
HOW LIKELY ARE YOU TO NOD OFF OR FALL ASLEEP WHEN YOU ARE A PASSENGER IN A CAR FOR AN HOUR WITHOUT A BREAK: WOULD NEVER DOZE
HOW LIKELY ARE YOU TO NOD OFF OR FALL ASLEEP WHILE WATCHING TV: MODERATE CHANCE OF DOZING
HOW LIKELY ARE YOU TO NOD OFF OR FALL ASLEEP IN A CAR, WHILE STOPPED FOR A FEW MINUTES IN TRAFFIC: WOULD NEVER DOZE
WORRIED_DISTRESSED_DUE_TO_SLEEP: A LITTLE
WAKING_TOO_EARLY: MILD
DIFFICULTY_FALLING_ASLEEP: MODERATE
HOW LIKELY ARE YOU TO NOD OFF OR FALL ASLEEP WHILE SITTING QUIETLY AFTER LUNCH WITHOUT ALCOHOL: WOULD NEVER DOZE
HOW LIKELY ARE YOU TO NOD OFF OR FALL ASLEEP WHILE SITTING AND READING: HIGH CHANCE OF DOZING
HOW LIKELY ARE YOU TO NOD OFF OR FALL ASLEEP WHILE SITTING AND TALKING TO SOMEONE: SLIGHT CHANCE OF DOZING
SLEEP_PROBLEM_INTERFERES_DAILY_ACTIVITIES: A LITTLE

## 2025-08-07 ASSESSMENT — PAIN SCALES - GENERAL: PAINLEVEL_OUTOF10: 0-NO PAIN

## 2025-08-07 NOTE — ASSESSMENT & PLAN NOTE
Did well on vacation when he was able to sleep from 9 PM - 7 AM. Needs almost 10 hours to function.  Work sleep schedule is 8 PM - 4:30 AM

## 2025-08-07 NOTE — PROGRESS NOTES
Patient: Bakari Delarosa    [id number]  : 1965 -- AGE 59 y.o.    Provider: Ruperto Orellana MD     Location [facility]   Service Date: 2025              [facility]  Followup Visit Note    Subjective   Patient ID: Bakari Delarosa is a 59 y.o. male who presents for Rem Sleep Behavior Disorder, Sleep Apnea, and Pap Adherence Followup.  HPI    Prior Sleep History:  2025: Office Visit: Dr. Ruperto Orellana:Bakari presents for RBD, RIMA, PLM-D and excessive daytime sleepiness  He had the cymbalta and wellbutrin adjusted for his depression.   He was prescribed prazosin by his PCP for nightmare disorder  He continues to have dream enactment behavior.  RBD did not tolerate clonazepam, or temazepam, melatonin ineffective. Stopped clonazepam due to too much sedation without significant impact on his RBD symptoms.  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 He does not have nightmare disorder, he has REM behavior disorder. It is ineffective for this  Trial trazodone for RBD, due to case studies. He did not tolerate clonazepam, did not respond to melatonin. Did discuss that we are now dealing with a very challenging case and will need to trial medications that are off label. I did go into detail about risk of serotonin syndrome with the addition of trazodone, considering he is already on Cymbalta and takes a triptan PRN. Handout provided. We will try ramelteon next for RBD. I did emphasize the need for CPAP compliance.  If an SSRI will be considered other than cymbalta, then paroxetine may be a good option, some case studies reported reduction in RBD symtpoms with paroxetine. Did discuss considering carbamazepine, can also consider imipramine.        Periodic limb movement disorder (PLMD) G47.61. He has tried ropinirole, made worse, gabapentin 300 mg, he stopped taking it. Consider gabapentin encarbil, can consider lyrica or  "buprenorphine for refractory RLS/PLMD, which I suspect is contributing to the excessive daytime sleepiness      Current Sleep History:  Bakari presents for management of his RBD he started 50 mg of trazodone and stopped taking it after 2 weeks as it was ineffective.  History of Present Illness  The patient presents for sleep issues.    He reports a decrease in sleep duration from 10 hours during his vacation to 7 hours currently. He goes to bed around 8:30 PM and wakes up between 4:00 AM and 4:30 AM. He uses a CPAP machine, which helps but can exacerbate his migraines. He is making efforts to improve sleep quality. He is unsure if he has tried Lyrica (pregabalin) before. He was on gabapentin.    MEDICATIONS  Current: Cymbalta, Wellbutrin  Past: Gabapentin   He was in [place] for 2 weeks and was not as tired  Bakari Delarosa reports good benefit from his device.     ESS: 9   Insomnia Severity Index  1. Difficulty falling asleep: Moderate  2. Difficulty staying asleep: Moderate  3. Problems waking up too early: Mild  4. How SATISFIED/DISSATISFIED are you with your CURRENT sleep pattern?: Dissatisfied  5. How NOTICEABLE to others do you think your sleep problem is in terms of impairing the quality of your life?: Somewhat  6. How WORRIED/DISTRESSED are you about your current sleep problem?: A Little  7. To what extent do you consider your sleep problem to INTERFERE with your daily functioning (e.g. daytime fatigue, mood, ability to function at work/daily chores, concentration, memory, mood, etc.) CURRENTLY?: A Little  ROSETTE TS: 0-7 = No clinically significant insomnia 8-14 = Subthreshold insomnia 15-21 = Clinical insomnia (moderate severity) 22-28 = Clinical insomnia (severe): 12   FOSQ 25    Review of Systems  Review of systems negative except as per HPI  Objective   /74   Pulse 90   Ht 1.854 m (6' 1\")   Wt 120 kg (265 lb)   SpO2 98%   BMI 34.96 kg/m²    PREVIOUS WEIGHTS:  Wt Readings from Last 3 Encounters: " "  08/07/25 120 kg (265 lb)   07/09/25 120 kg (264 lb 6.4 oz)   07/01/25 122 kg (268 lb 15.4 oz)     Physical Exam  PHYSICAL EXAM: GENERAL: alert pleasant and cooperative no acute distress  PSYCH EXAM: alert,oriented, in NAD with a full range of affect, normal behavior and no psychotic features    Results         A downloaded compliance report was reviewed and was interpreted by myself as follows:  > 4 hour compliance was 56.7 %, with an average use of 6 hours and 43 minutes, with a residual AHI 1.5 .     Lab Results   Component Value Date    IRON 157 10/03/2023    TIBC 380 10/03/2023    FERRITIN 137 07/12/2023        No results found for: \"[id number]\"          Assessment/Plan   Problem List Items Addressed This Visit           ICD-10-CM    Obstructive sleep apnea syndrome G47.33    - Bakari Delarosa  has sleep apnea and requires treatment.  - Bakari Delarosa demonstrates good compliance and benefit from PAP therapy  - Continue Auto PAP 4-16 cmH20   - Order for renewal of PAP supplies placed through [employer] [facility]             REM sleep behavior disorder G47.52    Refractory RBD discussed that next medication we will try after gabapentin enacarbil can be very oxazine but he would likely have to come off of his Cymbalta.  Can consider carbamazepine, Levatiracetum, methadone, paroxetine, rivastigmine, donepezil.  Trazodone was ineffective although may need to go up on the dose.  The other 1 to consider trying would be ramelteon.  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 Needs almost 10 hours to function.  Work sleep schedule is 8 PM - 4:30 AM            This medical note was created with the assistance of artificial intelligence (AI) for documentation purposes. " The content has been reviewed and confirmed by the healthcare provider for accuracy and completeness. Patient consented to the use of audio recording and use of AI during their visit.

## 2025-08-07 NOTE — ASSESSMENT & PLAN NOTE
Refractory RBD discussed that next medication we will try after gabapentin enacarbil can be very oxazine but he would likely have to come off of his Cymbalta.  Can consider carbamazepine, Levatiracetum, methadone, paroxetine, rivastigmine, donepezil.  Trazodone was ineffective although may need to go up on the dose.  The other 1 to consider trying would be ramelteon.  - Follow-up in 2 months

## 2025-08-07 NOTE — ASSESSMENT & PLAN NOTE
- Bakari Delarosa  has sleep apnea and requires treatment.  - Bakari Delarosa demonstrates good compliance and benefit from PAP therapy  - Continue Auto PAP 4-16 cmH20   - Order for renewal of PAP supplies placed through [facility]

## 2025-08-08 PROCEDURE — RXMED WILLOW AMBULATORY MEDICATION CHARGE

## 2025-08-12 ENCOUNTER — PHARMACY VISIT (OUTPATIENT)
Dept: PHARMACY | Facility: CLINIC | Age: 60
End: 2025-08-12
Payer: COMMERCIAL

## 2025-08-13 ENCOUNTER — APPOINTMENT (OUTPATIENT)
Dept: PRIMARY CARE | Facility: CLINIC | Age: 60
End: 2025-08-13
Payer: COMMERCIAL

## 2025-08-13 ENCOUNTER — TELEPHONE (OUTPATIENT)
Dept: SLEEP MEDICINE | Facility: HOSPITAL | Age: 60
End: 2025-08-13

## 2025-08-13 VITALS — SYSTOLIC BLOOD PRESSURE: 104 MMHG | HEART RATE: 116 BPM | DIASTOLIC BLOOD PRESSURE: 69 MMHG | TEMPERATURE: 96.9 F

## 2025-08-13 DIAGNOSIS — G47.61 PERIODIC LIMB MOVEMENT DISORDER (PLMD): ICD-10-CM

## 2025-08-13 DIAGNOSIS — F33.41 RECURRENT MAJOR DEPRESSIVE DISORDER, IN PARTIAL REMISSION: ICD-10-CM

## 2025-08-13 DIAGNOSIS — G25.81 RESTLESS LEG SYNDROME: ICD-10-CM

## 2025-08-13 DIAGNOSIS — M21.612 BUNION, LEFT: Primary | ICD-10-CM

## 2025-08-13 PROCEDURE — 99213 OFFICE O/P EST LOW 20 MIN: CPT | Performed by: INTERNAL MEDICINE

## 2025-08-13 RX ORDER — DULOXETIN HYDROCHLORIDE 60 MG/1
60 CAPSULE, DELAYED RELEASE ORAL 2 TIMES DAILY
Qty: 180 CAPSULE | Refills: 3 | Status: SHIPPED | OUTPATIENT
Start: 2025-08-13 | End: 2026-08-13

## 2025-08-13 RX ORDER — PREGABALIN 75 MG/1
CAPSULE ORAL
Qty: 53 CAPSULE | Refills: 0 | Status: SHIPPED | OUTPATIENT
Start: 2025-08-13 | End: 2025-09-12

## 2025-08-13 ASSESSMENT — ENCOUNTER SYMPTOMS
POLYDIPSIA: 0
FEVER: 0
SHORTNESS OF BREATH: 0
PALPITATIONS: 0
COUGH: 0
CHILLS: 0

## 2025-08-13 ASSESSMENT — PAIN SCALES - GENERAL: PAINLEVEL_OUTOF10: 2

## 2025-08-14 ENCOUNTER — APPOINTMENT (OUTPATIENT)
Dept: UROLOGY | Facility: CLINIC | Age: 60
End: 2025-08-14
Payer: COMMERCIAL

## 2025-08-25 DIAGNOSIS — G43.009 MIGRAINE WITHOUT AURA AND WITHOUT STATUS MIGRAINOSUS, NOT INTRACTABLE: ICD-10-CM

## 2025-08-25 PROCEDURE — RXMED WILLOW AMBULATORY MEDICATION CHARGE

## 2025-08-27 ENCOUNTER — PHARMACY VISIT (OUTPATIENT)
Dept: PHARMACY | Facility: CLINIC | Age: 60
End: 2025-08-27
Payer: COMMERCIAL

## 2025-08-28 DIAGNOSIS — G43.009 MIGRAINE WITHOUT AURA AND WITHOUT STATUS MIGRAINOSUS, NOT INTRACTABLE: ICD-10-CM

## 2025-08-28 RX ORDER — SUMATRIPTAN 5 MG/1
1 SPRAY NASAL EVERY 2 HOUR PRN
Qty: 3 EACH | Refills: 3 | Status: SHIPPED | OUTPATIENT
Start: 2025-08-28

## 2025-08-29 RX ORDER — SUMATRIPTAN 5 MG/1
SPRAY NASAL
Refills: 0 | OUTPATIENT
Start: 2025-08-29

## 2025-09-05 ENCOUNTER — HOSPITAL ENCOUNTER (OUTPATIENT)
Dept: RADIOLOGY | Facility: CLINIC | Age: 60
Discharge: HOME | End: 2025-09-05
Payer: COMMERCIAL

## 2025-09-05 DIAGNOSIS — N20.0 CALCULUS OF KIDNEY: ICD-10-CM

## 2025-09-05 PROCEDURE — 74018 RADEX ABDOMEN 1 VIEW: CPT

## 2025-10-22 ENCOUNTER — APPOINTMENT (OUTPATIENT)
Dept: ENDOCRINOLOGY | Facility: CLINIC | Age: 60
End: 2025-10-22
Payer: COMMERCIAL

## 2025-11-05 ENCOUNTER — APPOINTMENT (OUTPATIENT)
Dept: SLEEP MEDICINE | Facility: CLINIC | Age: 60
End: 2025-11-05
Payer: COMMERCIAL

## 2025-12-18 ENCOUNTER — APPOINTMENT (OUTPATIENT)
Dept: OPHTHALMOLOGY | Facility: CLINIC | Age: 60
End: 2025-12-18
Payer: COMMERCIAL

## (undated) DEVICE — ASCOPE 4 CYSTO - REVERSE DEFLECTION